# Patient Record
Sex: FEMALE | Race: BLACK OR AFRICAN AMERICAN
[De-identification: names, ages, dates, MRNs, and addresses within clinical notes are randomized per-mention and may not be internally consistent; named-entity substitution may affect disease eponyms.]

---

## 2017-04-06 ENCOUNTER — HOSPITAL ENCOUNTER (EMERGENCY)
Dept: HOSPITAL 27 - EMS | Age: 57
Discharge: HOME | End: 2017-04-06
Payer: COMMERCIAL

## 2017-04-06 VITALS — HEIGHT: 65 IN | BODY MASS INDEX: 30.05 KG/M2 | WEIGHT: 180.38 LBS

## 2017-04-06 VITALS — SYSTOLIC BLOOD PRESSURE: 136 MMHG | DIASTOLIC BLOOD PRESSURE: 76 MMHG

## 2017-04-06 DIAGNOSIS — Z79.82: ICD-10-CM

## 2017-04-06 DIAGNOSIS — G56.01: Primary | ICD-10-CM

## 2017-04-06 DIAGNOSIS — F17.210: ICD-10-CM

## 2017-04-06 DIAGNOSIS — K21.9: ICD-10-CM

## 2017-04-06 DIAGNOSIS — E11.9: ICD-10-CM

## 2017-04-06 DIAGNOSIS — I10: ICD-10-CM

## 2017-04-06 LAB — GLUCOSE BLDC GLUCOMTR-MCNC: 112 MG/DL (ref 70–110)

## 2017-04-06 PROCEDURE — 99283 EMERGENCY DEPT VISIT LOW MDM: CPT

## 2017-04-06 PROCEDURE — 82962 GLUCOSE BLOOD TEST: CPT

## 2017-04-06 PROCEDURE — 96372 THER/PROPH/DIAG INJ SC/IM: CPT

## 2017-05-14 ENCOUNTER — HOSPITAL ENCOUNTER (EMERGENCY)
Dept: HOSPITAL 27 - EMS | Age: 57
Discharge: HOME | End: 2017-05-14
Payer: COMMERCIAL

## 2017-05-14 VITALS — WEIGHT: 170.35 LBS | HEIGHT: 66 IN | BODY MASS INDEX: 27.38 KG/M2

## 2017-05-14 VITALS — SYSTOLIC BLOOD PRESSURE: 183 MMHG | DIASTOLIC BLOOD PRESSURE: 103 MMHG

## 2017-05-14 DIAGNOSIS — K21.9: ICD-10-CM

## 2017-05-14 DIAGNOSIS — E11.9: ICD-10-CM
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
## 2017-05-14 DIAGNOSIS — I10: ICD-10-CM
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
## 2017-05-14 DIAGNOSIS — S05.01XA: Primary | ICD-10-CM
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
## 2017-05-14 DIAGNOSIS — F17.210: ICD-10-CM

## 2017-05-14 PROCEDURE — 99173 VISUAL ACUITY SCREEN: CPT

## 2017-05-14 PROCEDURE — 99283 EMERGENCY DEPT VISIT LOW MDM: CPT

## 2017-05-14 PROCEDURE — 99406 BEHAV CHNG SMOKING 3-10 MIN: CPT

## 2018-03-24 ENCOUNTER — HOSPITAL ENCOUNTER (EMERGENCY)
Dept: HOSPITAL 27 - EMS | Age: 58
Discharge: HOME | End: 2018-03-24
Payer: COMMERCIAL

## 2018-03-24 VITALS — SYSTOLIC BLOOD PRESSURE: 155 MMHG | DIASTOLIC BLOOD PRESSURE: 83 MMHG

## 2018-03-24 VITALS — HEIGHT: 65 IN | WEIGHT: 184.39 LBS | BODY MASS INDEX: 30.72 KG/M2

## 2018-03-24 DIAGNOSIS — H10.212: ICD-10-CM
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
## 2018-03-24 DIAGNOSIS — I10: ICD-10-CM
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
## 2018-03-24 DIAGNOSIS — F17.210: ICD-10-CM

## 2018-03-24 DIAGNOSIS — Z79.82: ICD-10-CM

## 2018-03-24 DIAGNOSIS — S05.02XA: Primary | ICD-10-CM

## 2018-03-24 DIAGNOSIS — L25.9: ICD-10-CM

## 2018-03-24 DIAGNOSIS — K21.9: ICD-10-CM

## 2018-03-24 DIAGNOSIS — E11.9: ICD-10-CM

## 2018-03-24 LAB — GLUCOSE BLDC GLUCOMTR-MCNC: 189 MG/DL (ref 70–110)

## 2018-03-24 PROCEDURE — 99406 BEHAV CHNG SMOKING 3-10 MIN: CPT

## 2018-03-24 PROCEDURE — 82962 GLUCOSE BLOOD TEST: CPT

## 2018-03-24 PROCEDURE — 99284 EMERGENCY DEPT VISIT MOD MDM: CPT

## 2018-08-20 ENCOUNTER — HOSPITAL ENCOUNTER (EMERGENCY)
Dept: HOSPITAL 27 - EMS | Age: 58
LOS: 1 days | Discharge: HOME | End: 2018-08-21
Payer: COMMERCIAL

## 2018-08-20 VITALS — HEIGHT: 65 IN | WEIGHT: 186.4 LBS | BODY MASS INDEX: 31.06 KG/M2

## 2018-08-20 DIAGNOSIS — G89.29: ICD-10-CM

## 2018-08-20 DIAGNOSIS — S05.01XA: Primary | ICD-10-CM
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
## 2018-08-20 DIAGNOSIS — K21.9: ICD-10-CM

## 2018-08-20 DIAGNOSIS — E11.9: ICD-10-CM

## 2018-08-20 DIAGNOSIS — I10: ICD-10-CM
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
## 2018-08-20 DIAGNOSIS — Z79.84: ICD-10-CM
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
## 2018-08-20 DIAGNOSIS — F17.210: ICD-10-CM

## 2018-08-20 DIAGNOSIS — Z79.82: ICD-10-CM
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
## 2018-08-20 LAB — GLUCOSE BLDC GLUCOMTR-MCNC: 121 MG/DL (ref 70–110)

## 2018-08-20 PROCEDURE — 99283 EMERGENCY DEPT VISIT LOW MDM: CPT

## 2018-08-20 PROCEDURE — 99406 BEHAV CHNG SMOKING 3-10 MIN: CPT

## 2018-08-20 PROCEDURE — 82962 GLUCOSE BLOOD TEST: CPT

## 2018-08-21 VITALS — DIASTOLIC BLOOD PRESSURE: 74 MMHG | SYSTOLIC BLOOD PRESSURE: 140 MMHG

## 2020-08-21 ENCOUNTER — OFFICE VISIT CONVERTED (OUTPATIENT)
Dept: PODIATRY | Facility: CLINIC | Age: 60
End: 2020-08-21
Attending: PODIATRIST

## 2021-03-09 ENCOUNTER — HOSPITAL ENCOUNTER (OUTPATIENT)
Dept: MAMMOGRAPHY | Facility: HOSPITAL | Age: 61
Discharge: HOME | End: 2021-03-09
Attending: NURSE PRACTITIONER

## 2021-05-10 NOTE — H&P
History and Physical      Patient Name: Ronda Bell   Patient ID: [id number]   Sex: Female   YOB: 1960    Primary Care Provider: RAJ GRULLON   Referring Provider: RAJ GRULLON    Visit Date: August 21, 2020    Provider: Colby Lam DPM   Location: [facility]   Location Address: [address] [place] [address]  [id number]   Location Phone: [phone]          Chief Complaint  · Diabetic Foot Evaluation      History Of Present Illness  Ronda Bell presents to the office today as a new patient for a diabetic foot evaluation. On referral from RAJ GRULLON   Patient reports that she is a diabetic currently controlling diabetes with oral medication      New, Established, New Problem: new   Location: bilateral feet  Duration:  Onset: gradual  Nature: NIDDM  Stable, worsening, improving: stable  Aggravating factors:  Previous Treatment: oral medication    Patient denies any fevers, chills, nausea, vomiting, shortness of breathe, nor any other constitutional signs nor symptoms.    Pt states their most recent blood glucose reading was 120.       Past Medical History  Arthritis; Bunion; Corns and callus; Heel pain; Hyperlipidemia; Hypertension; Ingrown toenail; Numbness in feet; Stroke; Type 2 diabetes mellitus         Past Surgical History  C section         Medication List  aspirin 81 mg oral tablet,delayed release (DR/EC); carvedilol 6.25 mg oral tablet; metformin 500 mg oral tablet extended release 24 hr; multivitamin oral tablet; omeprazole 20 mg oral capsule,delayed release(DR/EC)         Allergy List  baclofen; Robaxin       Allergies Reconciled  Family Medical History  Family history of stroke; Family history of heart disease; Family history of diabetes mellitus (DM)         Social History  Alcohol (Never); Tobacco (Never)         Review of Systems  · Constitutional  o Denies  o : fatigue, night sweats  · Eyes  o Denies  o : double vision,  blurred vision  · HENT  o Denies  o : vertigo, recent head injury  · Cardiovascular  o Denies  o : chest pain, irregular heart beats  · Respiratory  o Denies  o : shortness of breath, productive cough  · Gastrointestinal  o Denies  o : nausea, vomiting  · Genitourinary  o Denies  o : dysuria, urinary retention  · Integument  o Denies  o : hair growth change, new skin lesions  · Neurologic  o Denies  o : altered mental status, seizures  · Musculoskeletal  o * See HPI  · Endocrine  o Denies  o : cold intolerance, heat intolerance  · Heme-Lymph  o Denies  o : petechiae, lymph node enlargement or tenderness  · Allergic-Immunologic  o Denies  o : frequent illnesses      Vitals  Date Time BP Position Site L\R Cuff Size HR RR TEMP (F) WT  HT  BMI kg/m2 BSA m2 O2 Sat HC       08/21/2020 10:37 /76 Sitting    65 - R  96.9 187lbs 7oz    100 %          Physical Examination  · Constitutional  o Appearance  o : awake, alert, well-developed, and well nourished   · Cardiovascular  o Peripheral Vascular System  o :   § Extremities  § : no edema noted  · Musculoskeletal  o Extremeties/Joint  o : Lower extremity muscle strength and range of motion is equal and symmetrical bilaterally. The knees are noted to be in normal alignment. Bilateral Medial deviation of the first metatarsal with associated lateral deviation of the hallux at the metatarsal phalangeal joints.  · Left DM Foot Exam  o Sensation  o : Sharp/dull sensation is within normal limits. [place]usam 5.07 monofilament intact to all assessed areas.   o Visual Inspection  o : Skin is noted to have normal texture and turgor, with no excrescences noted. The toenails are noted to be without disease  o Vascular  o : palpable dorsalis pedis and posterir tibialis pulses present, normal capillary refill  · Right DM Foot Exam  o Sensation  o : Sharp/dull sensation is within normal limits. [place]am 5.07 monofilament intact to all assessed areas.   o Visual  Inspection  o : Skin is noted to have normal texture and turgor, with no excrescences noted. The toenails are noted to be without disease  o Vascular  o : palpable dorsalis pedis and posterir tibialis pulses present, normal capillary refill          Assessment  · Diabetes     250.00/E11.9  · Foot pain, left     729.5/M79.672  · Foot pain, right     729.5/M79.671  · Hallux abducto valgus, bilateral       Hallux valgus (acquired), right foot     735.0/M20.11  Hallux valgus (acquired), left foot     735.0/M20.12      Plan  · Orders  o Diabetic Foot (Motor and Sensory) Exam Completed [facility] (, , 2028F) - - 08/21/2020  · Medications  o Medications have been Reconciled  o Transition of Care or Provider Policy  · Instructions  o Patient is to return in one year for their Podiatric Diabetic Evaluation. Diabetic foot exam performed and documented this date, compliant with CQM required standards. Detail of findings as noted in physical exam.Lower extremity Neuro exam for diabetic patient performed and documented this date, compliant with PQRS required standards. Detail of findings as noted in physical exam.Advised patient importance of good routine lower extremity hygiene. Advised patient importance of evaluating for intact skin and pain free nail borders. Advised patient to use mirror to evaluate plantar/ soles of feet for better visualization. Advised patient monitor and phone office to be seen if any cracking to skin, open lesions, painful nail borders or if nails become elongated prior to next visit. Advised patient importance of daily cleansing of lower extremities, followed by good skin cream to maintain normal hydration of skin. Also advised patient importance of close daily monitoring of blood sugar. Advised to regulate diet and medications to maintain control of blood sugar in optimal range. Contact primary care provider if difficulties maintaining blood sugar levels.Advised Patient of presence of Diabetes  Mellitus condition. Advised Patient risk of progression and worsening or improvement, then return of condition. Will monitor condition for any change in future. Treat with most appropriate treatment pending status of condition.Counseled and advised patient extensively on nature and ramifications of diabetes. Standard instructions given to patient for good diabetic foot care and maintenance. Advised importance of careful monitoring to avoid break down and complications secondary to diabetes. Advised patient importance of strict maintenance of blood sugar control. Advised patient of possible ominous results from neglect of condition,i.e.: amputation/ loss of digits, feet and legs, or even death.Patient states understands counseling, will monitor closely, continue good hygiene and routine diabetic foot care. Patient will contact office is questions or problems.   o Upon discussion of surgical correction, post-operative requirements along with risk and benefits of the surgery, the patient states they do not want to pursue surgery at this time. Procedure: Right Reverdin-Green-Bethany Bunionectomy.  o Electronically Identified Patient Education Materials Provided Electronically  · Disposition  o Call or Return if symptoms worsen or persist.            Electronically Signed by: Colby Lam DPM -Author on August 21, 2020 11:07:44 AM

## 2021-05-14 VITALS
DIASTOLIC BLOOD PRESSURE: 76 MMHG | HEART RATE: 65 BPM | OXYGEN SATURATION: 100 % | WEIGHT: 187.44 LBS | SYSTOLIC BLOOD PRESSURE: 140 MMHG | TEMPERATURE: 96.9 F

## 2021-08-23 ENCOUNTER — OFFICE VISIT (OUTPATIENT)
Dept: PODIATRY | Facility: CLINIC | Age: 61
End: 2021-08-23

## 2021-08-23 VITALS
HEART RATE: 76 BPM | SYSTOLIC BLOOD PRESSURE: 137 MMHG | WEIGHT: 174 LBS | DIASTOLIC BLOOD PRESSURE: 91 MMHG | OXYGEN SATURATION: 100 % | TEMPERATURE: 96.9 F

## 2021-08-23 DIAGNOSIS — M20.12 VALGUS DEFORMITY OF BOTH GREAT TOES: ICD-10-CM

## 2021-08-23 DIAGNOSIS — E11.8 DIABETIC FOOT (HCC): ICD-10-CM

## 2021-08-23 DIAGNOSIS — E11.9 NON-INSULIN DEPENDENT TYPE 2 DIABETES MELLITUS (HCC): Primary | ICD-10-CM

## 2021-08-23 DIAGNOSIS — M20.11 VALGUS DEFORMITY OF BOTH GREAT TOES: ICD-10-CM

## 2021-08-23 PROCEDURE — 99213 OFFICE O/P EST LOW 20 MIN: CPT | Performed by: PODIATRIST

## 2021-08-23 RX ORDER — ASPIRIN 81 MG/1
81 TABLET ORAL DAILY
COMMUNITY

## 2021-08-23 RX ORDER — OMEPRAZOLE 20 MG/1
CAPSULE, DELAYED RELEASE ORAL
COMMUNITY
End: 2022-04-25 | Stop reason: ALTCHOICE

## 2021-08-23 RX ORDER — METFORMIN HYDROCHLORIDE 500 MG/1
TABLET, EXTENDED RELEASE ORAL
COMMUNITY
End: 2022-04-25 | Stop reason: ALTCHOICE

## 2021-08-23 RX ORDER — TOLNAFTATE 1 %
AEROSOL, SPRAY (GRAM) TOPICAL
COMMUNITY
Start: 2021-07-07 | End: 2022-04-25

## 2021-08-23 RX ORDER — FLUCONAZOLE 150 MG/1
TABLET ORAL
COMMUNITY
Start: 2021-07-07 | End: 2022-05-02

## 2021-08-23 NOTE — PROGRESS NOTES
[facility] - PODIATRY    Today's Date: 21    Patient Name: Ronda Bell  MRN: [id number]  [facility]: [id number]  PCP: Provider, No Known  Referring Provider: No ref. provider found    SUBJECTIVE     Chief Complaint   Patient presents with   • Left Foot - Diabetes, Annual Exam   • Right Foot - Diabetes, Annual Exam     HPI: Ronda Bell, a 61 y.o.female, comes presents to clinic for a diabetic foot evaluation.    New, Established, New Problem: Established    Location:      Duration:     Onset: Insidious    Nature: NIDDM    Stable, worsening, improving: Stable    Patient controlling diabetes via: Oral medications    Patient denies any fevers, chills, nausea, vomiting, shortness of breathe, nor any other constitutional signs nor symptoms.    No other pedal complaints at this time.    Past Medical History:   Diagnosis Date   • Arthritis    • Bunion    • Corns and callus    • Foot pain, right 10/27/2020   • Heel pain    • HTN (hypertension)    • Hyperlipidemia    • Ingrown toenail    • Numbness in feet    • Stroke (CMS/HCC)    • Type 2 diabetes mellitus (CMS/[facility])      Past Surgical History:   Procedure Laterality Date   •  SECTION       Family History   Problem Relation Age of Onset   • Heart disease Mother    • Diabetes Mother    • Stroke Brother      Social History     Socioeconomic History   • Marital status:      Spouse name: Not on file   • Number of children: Not on file   • Years of education: Not on file   • Highest education level: Not on file   Tobacco Use   • Smoking status: Former Smoker     Types: Cigarettes   • Smokeless tobacco: Never Used   Vaping Use   • Vaping Use: Never used   Substance and Sexual Activity   • Alcohol use: Never   • Drug use: Not Currently     Types: Cocaine(coke)   • Sexual activity: Defer     Allergies   Allergen Reactions   • Baclofen Dizziness   • Methocarbamol Dizziness     Current Outpatient Medications   Medication Sig  Dispense Refill   • aspirin (aspirin) 81 MG EC tablet aspirin 81 mg oral tablet,delayed release (DR/EC) take 1 tablet (81 mg) by oral route once daily   Active     • fluconazole (DIFLUCAN) 150 MG tablet TAKE 1 TABLET BY MOUTH 1 TIME WEEKLY FOR 10 WEEKS     • Lotrimin AF Deodorant Powder 2 % aerosol powder APPLY TO AFFECTED AREA THREE TIMES DAILY     • metFORMIN ER (GLUCOPHAGE-XR) 500 MG 24 hr tablet metformin 500 mg oral tablet extended release 24 hr take 1 tablet by oral route 2 times a day   Active     • metoprolol tartrate (LOPRESSOR) 25 MG tablet Take 25 mg by mouth Daily.     • Multiple Vitamins-Minerals (MULTIVITAMIN ADULT EXTRA C PO) multivitamin oral tablet take 1 tablet by oral route daily   Active     • omeprazole (priLOSEC) 20 MG capsule omeprazole 20 mg oral capsule,delayed release(DR/EC) take 1 capsule (20 mg) by oral route once daily before a meal   Active       No current facility-administered medications for this visit.     Review of Systems   Constitutional: Negative.    All other systems reviewed and are negative.      OBJECTIVE     Vitals:    08/23/21 0908   BP: 137/91   Pulse: 76   Temp: 96.9 °F (36.1 °C)   SpO2: 100%       There is no height or weight on file to calculate BMI.    Lab Results   Component Value Date    HGBA1C 6.3 (H) 10/17/2019       Lab Results   Component Value Date    CALCIUM 9.1 10/04/2020     10/04/2020    K 4.1 10/04/2020    CO2 28 10/04/2020     10/04/2020    BUN 8 10/04/2020    CREATININE 0.95 (H) 10/04/2020    BCR 8 10/04/2020    ANIONGAP 13 10/04/2020       Patient seen in no apparent distress.      PHYSICAL EXAM:     Foot/Ankle Exam:       General:   Diabetic Foot Exam Performed    Appearance: appears stated age and healthy    Orientation: AAOx3    Affect: appropriate    Gait: unimpaired    Shoe Gear:  Casual shoes    VASCULAR      Right Foot Vascularity   Normal vascular exam    Dorsalis pedis:  2+  Posterior tibial:  2+  Skin Temperature: warm    Edema  Grading:  None  CFT:  < 3 seconds  Pedal Hair Growth:  Present  Varicosities: none       Left Foot Vascularity   Normal vascular exam    Dorsalis pedis:  2+  Posterior tibial:  2+  Skin Temperature: warm    Edema Grading:  None  CFT:  < 3 seconds  Pedal Hair Growth:  Present  Varicosities: none        NEUROLOGIC     Right Foot Neurologic   Normal sensation    Light touch sensation:  Normal  Vibratory sensation:  Normal  Hot/Cold sensation: normal    Protective Sensation using [place]usam Monofilament:  10     Left Foot Neurologic   Normal sensation    Light touch sensation:  Normal  Vibratory sensation:  Normal  Hot/cold sensation: normal    Protective Sensation using [place]usam Monofilament:  10     MUSCULOSKELETAL      Right Foot Musculoskeletal   Hallux valgus: Yes       Left Foot Musculoskeletal   Hallux valgus: Yes       MUSCLE STRENGTH     Right Foot Muscle Strength   Normal strength    Foot dorsiflexion:  5  Foot plantar flexion:  5  Foot inversion:  5  Foot eversion:  5     Left Foot Muscle Strength   Foot dorsiflexion:  5  Foot plantar flexion:  5  Foot inversion:  5  Foot eversion:  5     RANGE OF MOTION      Right Foot Range of Motion   Foot and ankle ROM within normal limits       Left Foot Range of Motion   Foot and ankle ROM within normal limits       DERMATOLOGIC     Right Foot Dermatologic   Skin: skin intact    Nails: normal       Left Foot Dermatologic   Skin: skin intact    Nails: normal        Diabetic Foot Exam Performed      ASSESSMENT/PLAN     Diagnoses and all orders for this visit:    1. Non-insulin dependent type 2 diabetes mellitus (CMS/HCC) (Primary)    2. Diabetic foot (CMS/HCC)    3. Valgus deformity of both great toes        Comprehensive lower extremity examination and evaluation was performed.    Discussed findings and treatment plan including risks, benefits, and treatment options with patient in detail. Patient agreed with treatment plan.    Diabetic foot exam performed  and documented this date, compliant with CQM required standards. Detail of findings as noted in physical exam.  Lower extremity Neurologic exam for diabetic patient performed and documented this date, compliant with PQRS required standards. Detail of findings as noted in physical exam.  Advised patient importance of good routine lower extremity hygiene. Advised patient importance of evaluating for intact skin and pain free nail borders.  Advised patient to use mirror to evaluate plantar/ soles of feet for better visualization. Advised patient monitor and phone office to be seen if any cracking to skin, open lesions, painful nail borders or if nails become elongated prior to next visit. Advised patient importance of daily cleansing of lower extremities, followed by good skin cream to maintain normal hydration of skin. Also advised patient importance of close daily monitoring of blood sugar. Advised to regulate diet and medications to maintain control of blood sugar in optimal range. Contact primary care provider if difficulties maintaining blood sugar levels.  Advised Patient of presence of Diabetes Mellitus condition.  Advised Patient risk of progression and worsening or improvement, then return of condition.  Will monitor condition for any change in future. Treat with most appropriate treatment pending status of condition.  Counseled and advised patient extensively on nature and ramifications of diabetes. Standard instructions given to patient for good diabetic foot care and maintenance. Advised importance of careful monitoring to avoid break down and complications secondary to diabetes. Advised patient importance of strict maintenance of blood sugar control. Advised patient of possible ominous results from neglect of condition, i.e.: amputation/ loss of digits, feet and legs, or even death.  Patient states understands counseling, will monitor closely, continue good hygiene and routine diabetic foot care. Patient  will contact office is questions or problems.      An After Visit Summary was printed and given to the patient at discharge, including (if requested) any available informative/educational handouts regarding diagnosis, treatment, or medications. All questions were answered to patient/family satisfaction. Should symptoms fail to improve or worsen they agree to call or return to clinic or to go to the Emergency Department. Discussed the importance of following up with any needed screening tests/labs/specialist appointments and any requested follow-up recommended by me today. Importance of maintaining follow-up discussed and patient accepts that missed appointments can delay diagnosis and potentially lead to worsening of conditions.    Return in about 1 year (around 8/23/2022) for Podiatry Diabetic Foot Exam., or sooner if acute issues arise.    This document has been electronically signed by Colby Lam DPM on August 23, 2021 09:33 EDT

## 2022-04-25 ENCOUNTER — OFFICE VISIT (OUTPATIENT)
Dept: PODIATRY | Facility: CLINIC | Age: 62
End: 2022-04-25

## 2022-04-25 VITALS
HEIGHT: 65 IN | HEART RATE: 73 BPM | TEMPERATURE: 96.9 F | OXYGEN SATURATION: 100 % | DIASTOLIC BLOOD PRESSURE: 56 MMHG | BODY MASS INDEX: 29.16 KG/M2 | WEIGHT: 175 LBS | SYSTOLIC BLOOD PRESSURE: 142 MMHG

## 2022-04-25 DIAGNOSIS — M79.671 FOOT PAIN, RIGHT: Primary | ICD-10-CM

## 2022-04-25 DIAGNOSIS — M20.11 HALLUX VALGUS OF RIGHT FOOT: ICD-10-CM

## 2022-04-25 DIAGNOSIS — E11.8 DIABETIC FOOT: ICD-10-CM

## 2022-04-25 DIAGNOSIS — E11.9 NON-INSULIN DEPENDENT TYPE 2 DIABETES MELLITUS: ICD-10-CM

## 2022-04-25 PROCEDURE — 99214 OFFICE O/P EST MOD 30 MIN: CPT | Performed by: PODIATRIST

## 2022-04-25 RX ORDER — AMLODIPINE BESYLATE 10 MG/1
10 TABLET ORAL DAILY
COMMUNITY
Start: 2022-04-15

## 2022-04-25 RX ORDER — ATORVASTATIN CALCIUM 20 MG/1
20 TABLET, FILM COATED ORAL NIGHTLY
COMMUNITY
Start: 2022-04-15

## 2022-04-25 RX ORDER — PANTOPRAZOLE SODIUM 20 MG/1
20 TABLET, DELAYED RELEASE ORAL DAILY
COMMUNITY
Start: 2022-02-27

## 2022-04-25 NOTE — PROGRESS NOTES
[facility] - PODIATRY    Today's Date: 22    Patient Name: Ronda Bell  MRN: [id number]  CSN: [id number]  PCP: Johnna Mcnair APRN  Referring Provider: No ref. provider found    SUBJECTIVE     Chief Complaint   Patient presents with   • Right Foot - Pre-op Exam     HPI: Ronda Bell, a 61 y.o.female, comes presents to clinic for preoperative history and physical for right bunionectomy    New, Established, New Problem: Established    Location: Right first ray    Duration:     Onset: Insidious    Nature: Sore, achy    Stable, worsening, improving: Worsening    Patient controlling diabetes via: Oral medications    Patient denies any fevers, chills, nausea, vomiting, shortness of breathe, nor any other constitutional signs nor symptoms.    Procedure treated with change in shoe gear and activities.    Patient states their most recent HgB A1C was 6.2.    Past Medical History:   Diagnosis Date   • Arthritis    • Bunion    • Corns and callus    • Foot pain, right 10/27/2020   • Heel pain    • HTN (hypertension)    • Hyperlipidemia    • Ingrown toenail    • Numbness in feet    • Stroke (HCC)    • Type 2 diabetes mellitus (HCC)      Past Surgical History:   Procedure Laterality Date   •  SECTION       Family History   Problem Relation Age of Onset   • Heart disease Mother    • Diabetes Mother    • Stroke Brother      Social History     Socioeconomic History   • Marital status:    Tobacco Use   • Smoking status: Former Smoker     Types: Cigarettes   • Smokeless tobacco: Never Used   Vaping Use   • Vaping Use: Never used   Substance and Sexual Activity   • Alcohol use: Yes     Comment: occas   • Drug use: Not Currently     Types: Cocaine(coke)   • Sexual activity: Defer     Allergies   Allergen Reactions   • Baclofen Dizziness   • Methocarbamol Dizziness     Current Outpatient Medications   Medication Sig Dispense Refill   • amLODIPine (NORVASC) 10 MG tablet Take 10 mg by  mouth Daily.     • aspirin 81 MG EC tablet aspirin 81 mg oral tablet,delayed release (DR/EC) take 1 tablet (81 mg) by oral route once daily   Active     • atorvastatin (LIPITOR) 20 MG tablet      • fluconazole (DIFLUCAN) 150 MG tablet TAKE 1 TABLET BY MOUTH 1 TIME WEEKLY FOR 10 WEEKS     • metFORMIN (GLUCOPHAGE) 500 MG tablet      • metoprolol tartrate (LOPRESSOR) 25 MG tablet Take 25 mg by mouth Daily.     • Multiple Vitamins-Minerals (MULTIVITAMIN ADULT EXTRA C PO) multivitamin oral tablet take 1 tablet by oral route daily   Active     • pantoprazole (PROTONIX) 20 MG EC tablet        No current facility-administered medications for this visit.     Review of Systems   Constitutional: Negative.    Musculoskeletal:        Painful right first ray bunion   All other systems reviewed and are negative.      OBJECTIVE     Vitals:    04/25/22 1506   BP: 142/56   Pulse: 73   Temp: 96.9 °F (36.1 °C)   SpO2: 100%       Body mass index is 29.12 kg/m².    Lab Results   Component Value Date    HGBA1C 6.3 (H) 10/17/2019       Lab Results   Component Value Date    GLUCOSE 99 10/04/2020    CALCIUM 9.1 10/04/2020     10/04/2020    K 4.1 10/04/2020    CO2 28 10/04/2020     10/04/2020    BUN 8 10/04/2020    CREATININE 0.95 (H) 10/04/2020    BCR 8 10/04/2020    ANIONGAP 13 10/04/2020       Patient seen in no apparent distress.      PHYSICAL EXAM:     Foot/Ankle Exam:       General:   Appearance: appears stated age and healthy    Orientation: AAOx3    Affect: appropriate    Gait: unimpaired    Shoe Gear:  Casual shoes    VASCULAR      Right Foot Vascularity   Normal vascular exam    Dorsalis pedis:  2+  Posterior tibial:  2+  Skin Temperature: warm    Edema Grading:  None  CFT:  < 3 seconds  Pedal Hair Growth:  Present  Varicosities: none       Left Foot Vascularity   Normal vascular exam    Dorsalis pedis:  2+  Posterior tibial:  2+  Skin Temperature: warm    Edema Grading:  None  CFT:  < 3 seconds  Pedal Hair Growth:   Present  Varicosities: none        NEUROLOGIC     Right Foot Neurologic   Normal sensation    Light touch sensation:  Normal  Vibratory sensation:  Normal  Hot/Cold sensation: normal    Protective Sensation using [place]-Husam Monofilament:  10     Left Foot Neurologic   Normal sensation    Light touch sensation:  Normal  Vibratory sensation:  Normal  Hot/cold sensation: normal    Protective Sensation using [place]-Husam Monofilament:  10     MUSCULOSKELETAL      Right Foot Musculoskeletal   Hallux valgus: Yes       Left Foot Musculoskeletal   Hallux valgus: Yes       MUSCLE STRENGTH     Right Foot Muscle Strength   Normal strength    Foot dorsiflexion:  5  Foot plantar flexion:  5  Foot inversion:  5  Foot eversion:  5     Left Foot Muscle Strength   Foot dorsiflexion:  5  Foot plantar flexion:  5  Foot inversion:  5  Foot eversion:  5     RANGE OF MOTION      Right Foot Range of Motion   Foot and ankle ROM within normal limits       Left Foot Range of Motion   Foot and ankle ROM within normal limits       DERMATOLOGIC     Right Foot Dermatologic   Skin: skin intact    Nails: normal       Left Foot Dermatologic   Skin: skin intact    Nails: normal        Diabetic Foot Exam Performed    XR Foot 3+ View Right    Result Date: 4/25/2022  Narrative: IN-OFFICE IMAGING:  3 view, AP, MO, Lateral, right foot Indication: Bunion pain Findings: Medial deviation of the first metatarsal with associated lateral deviation of the hallux at the metatarsal phalangeal joint.  Anterior cyma line break seen.  No periosteal reactions nor osteolytic changes seen.  No occult fractures seen. Comparison: No comparison views available.         ASSESSMENT/PLAN     Diagnoses and all orders for this visit:    1. Foot pain, right (Primary)    2. Hallux valgus of right foot  -     Case Request; Standing  -     Case Request    3. Diabetic foot (HCC)    4. Non-insulin dependent type 2 diabetes mellitus (HCC)        Comprehensive lower  extremity examination and evaluation was performed.    Discussed findings and treatment plan including risks, benefits, and treatment options with patient in detail. Patient agreed with treatment plan.    Planned surgery: Right Reverdin-Green-Isola Bunionectomy with Akin bunionectomy.  The risks and benefits of the surgery were discussed with the patient.  This discussion included possible complications of requiring further surgery, possible delayed wound healing, further surgery requiring amputation of the foot or leg, and also included the complication of death.  All the patient's questions were answered to their satisfaction.  Patient states they would like to proceed with the procedure.  Upon discussion of non-surgical conservative option, surgical correction, post-operative requirements along with risk and benefits of the surgery along with expected outcomes, the patient states they would like to proceed with the scheduling surgery.    The surgery is planned for 6 May 2022.    An After Visit Summary was printed and given to the patient at discharge, including (if requested) any available informative/educational handouts regarding diagnosis, treatment, or medications. All questions were answered to patient/family satisfaction. Should symptoms fail to improve or worsen they agree to call or return to clinic or to go to the Emergency Department. Discussed the importance of following up with any needed screening tests/labs/specialist appointments and any requested follow-up recommended by me today. Importance of maintaining follow-up discussed and patient accepts that missed appointments can delay diagnosis and potentially lead to worsening of conditions.    Return in about 15 days (around 5/10/2022) for Post Operative., or sooner if acute issues arise.    This document has been electronically signed by Colby Lam DPM on April 25, 2022 16:29 EDT

## 2022-05-02 NOTE — NURSING NOTE
SPOKE WITH TU AT DR MITCHELL OFFICE TO VERIFY IF AND WHEN WANTED ASA STOPPED. PER DR MITCHELL TO STOP TODAY. PT NOTIFIED AND UPDATED SHE REPORTED HAD NOT TAKEN A DOSE TODAY YET AND THAT HER LAST DOSE WAS 5/1/22.

## 2022-05-06 ENCOUNTER — ANESTHESIA (OUTPATIENT)
Dept: PERIOP | Facility: HOSPITAL | Age: 62
End: 2022-05-06

## 2022-05-06 ENCOUNTER — ANESTHESIA EVENT (OUTPATIENT)
Dept: PERIOP | Facility: HOSPITAL | Age: 62
End: 2022-05-06

## 2022-05-06 ENCOUNTER — HOSPITAL ENCOUNTER (OUTPATIENT)
Facility: HOSPITAL | Age: 62
Setting detail: HOSPITAL OUTPATIENT SURGERY
Discharge: HOME | End: 2022-05-06
Attending: PODIATRIST | Admitting: PODIATRIST

## 2022-05-06 VITALS
SYSTOLIC BLOOD PRESSURE: 127 MMHG | HEIGHT: 65 IN | RESPIRATION RATE: 16 BRPM | HEART RATE: 76 BPM | TEMPERATURE: 97 F | OXYGEN SATURATION: 99 % | DIASTOLIC BLOOD PRESSURE: 60 MMHG | WEIGHT: 184.53 LBS | BODY MASS INDEX: 30.74 KG/M2

## 2022-05-06 DIAGNOSIS — M20.11 HALLUX VALGUS OF RIGHT FOOT: ICD-10-CM

## 2022-05-06 LAB
GLUCOSE BLDC GLUCOMTR-MCNC: 112 MG/DL (ref 70–99)
GLUCOSE BLDC GLUCOMTR-MCNC: 94 MG/DL (ref 70–99)

## 2022-05-06 PROCEDURE — 0 LIDOCAINE 1 % SOLUTION: Performed by: PODIATRIST

## 2022-05-06 PROCEDURE — 28299 COR HLX VLGS DOUBLE OSTEOT: CPT | Performed by: PODIATRIST

## 2022-05-06 PROCEDURE — 28299 COR HLX VLGS DOUBLE OSTEOT: CPT | Performed by: SPECIALIST/TECHNOLOGIST, OTHER

## 2022-05-06 PROCEDURE — C1713 ANCHOR/SCREW BN/BN,TIS/BN: HCPCS | Performed by: PODIATRIST

## 2022-05-06 PROCEDURE — 25010000002 KETOROLAC TROMETHAMINE PER 15 MG: Performed by: NURSE ANESTHETIST, CERTIFIED REGISTERED

## 2022-05-06 PROCEDURE — 25010000002 CEFAZOLIN IN DEXTROSE 2-4 GM/100ML-% SOLUTION: Performed by: PODIATRIST

## 2022-05-06 PROCEDURE — 63710000001 PROMETHAZINE PER 12.5 MG: Performed by: NURSE ANESTHETIST, CERTIFIED REGISTERED

## 2022-05-06 PROCEDURE — 25010000002 ONDANSETRON PER 1 MG: Performed by: NURSE ANESTHETIST, CERTIFIED REGISTERED

## 2022-05-06 PROCEDURE — 25010000002 MIDAZOLAM PER 1 MG: Performed by: STUDENT IN AN ORGANIZED HEALTH CARE EDUCATION/TRAINING PROGRAM

## 2022-05-06 PROCEDURE — 25010000002 FENTANYL CITRATE (PF) 50 MCG/ML SOLUTION: Performed by: NURSE ANESTHETIST, CERTIFIED REGISTERED

## 2022-05-06 PROCEDURE — 25010000002 PROPOFOL 10 MG/ML EMULSION: Performed by: NURSE ANESTHETIST, CERTIFIED REGISTERED

## 2022-05-06 PROCEDURE — 82962 GLUCOSE BLOOD TEST: CPT

## 2022-05-06 DEVICE — STRAIGHT STAPLE ASSEMBLY, 8 X 8MM
Type: IMPLANTABLE DEVICE | Site: FOOT | Status: FUNCTIONAL
Brand: JAWS NITINOL STAPLE SYSTEM

## 2022-05-06 DEVICE — MINI MONSTER HEADLESS, SHORT THREAD, 2.5 X 22MM
Type: IMPLANTABLE DEVICE | Status: FUNCTIONAL
Brand: MONSTER SCREW SYSTEM

## 2022-05-06 DEVICE — WAX,BONE,NATURAL
Type: IMPLANTABLE DEVICE | Site: FOOT | Status: FUNCTIONAL
Brand: MEDLINE INDUSTRIES

## 2022-05-06 RX ORDER — FENTANYL CITRATE 50 UG/ML
INJECTION, SOLUTION INTRAMUSCULAR; INTRAVENOUS AS NEEDED
Status: DISCONTINUED | OUTPATIENT
Start: 2022-05-06 | End: 2022-05-06 | Stop reason: SURG

## 2022-05-06 RX ORDER — LIDOCAINE HYDROCHLORIDE 20 MG/ML
INJECTION, SOLUTION EPIDURAL; INFILTRATION; INTRACAUDAL; PERINEURAL AS NEEDED
Status: DISCONTINUED | OUTPATIENT
Start: 2022-05-06 | End: 2022-05-06 | Stop reason: SURG

## 2022-05-06 RX ORDER — KETOROLAC TROMETHAMINE 30 MG/ML
INJECTION, SOLUTION INTRAMUSCULAR; INTRAVENOUS AS NEEDED
Status: DISCONTINUED | OUTPATIENT
Start: 2022-05-06 | End: 2022-05-06 | Stop reason: SURG

## 2022-05-06 RX ORDER — ONDANSETRON 2 MG/ML
4 INJECTION INTRAMUSCULAR; INTRAVENOUS ONCE AS NEEDED
Status: DISCONTINUED | OUTPATIENT
Start: 2022-05-06 | End: 2022-05-06 | Stop reason: HOSPADM

## 2022-05-06 RX ORDER — MEPERIDINE HYDROCHLORIDE 25 MG/ML
12.5 INJECTION INTRAMUSCULAR; INTRAVENOUS; SUBCUTANEOUS
Status: DISCONTINUED | OUTPATIENT
Start: 2022-05-06 | End: 2022-05-06 | Stop reason: HOSPADM

## 2022-05-06 RX ORDER — ONDANSETRON 2 MG/ML
INJECTION INTRAMUSCULAR; INTRAVENOUS AS NEEDED
Status: DISCONTINUED | OUTPATIENT
Start: 2022-05-06 | End: 2022-05-06 | Stop reason: SURG

## 2022-05-06 RX ORDER — PROMETHAZINE HYDROCHLORIDE 12.5 MG/1
25 TABLET ORAL ONCE AS NEEDED
Status: COMPLETED | OUTPATIENT
Start: 2022-05-06 | End: 2022-05-06

## 2022-05-06 RX ORDER — CEFAZOLIN SODIUM 2 G/100ML
2 INJECTION, SOLUTION INTRAVENOUS ONCE
Status: COMPLETED | OUTPATIENT
Start: 2022-05-06 | End: 2022-05-06

## 2022-05-06 RX ORDER — LIDOCAINE HYDROCHLORIDE 10 MG/ML
INJECTION, SOLUTION INFILTRATION; PERINEURAL AS NEEDED
Status: DISCONTINUED | OUTPATIENT
Start: 2022-05-06 | End: 2022-05-06 | Stop reason: HOSPADM

## 2022-05-06 RX ORDER — SODIUM CHLORIDE, SODIUM LACTATE, POTASSIUM CHLORIDE, CALCIUM CHLORIDE 600; 310; 30; 20 MG/100ML; MG/100ML; MG/100ML; MG/100ML
9 INJECTION, SOLUTION INTRAVENOUS CONTINUOUS PRN
Status: DISCONTINUED | OUTPATIENT
Start: 2022-05-06 | End: 2022-05-06 | Stop reason: HOSPADM

## 2022-05-06 RX ORDER — MIDAZOLAM HYDROCHLORIDE 1 MG/ML
2 INJECTION INTRAMUSCULAR; INTRAVENOUS ONCE
Status: COMPLETED | OUTPATIENT
Start: 2022-05-06 | End: 2022-05-06

## 2022-05-06 RX ORDER — SUCCINYLCHOLINE/SOD CL,ISO/PF 100 MG/5ML
SYRINGE (ML) INTRAVENOUS AS NEEDED
Status: DISCONTINUED | OUTPATIENT
Start: 2022-05-06 | End: 2022-05-06 | Stop reason: SURG

## 2022-05-06 RX ORDER — ACETAMINOPHEN 500 MG
1000 TABLET ORAL ONCE
Status: COMPLETED | OUTPATIENT
Start: 2022-05-06 | End: 2022-05-06

## 2022-05-06 RX ORDER — GLYCOPYRROLATE 0.2 MG/ML
0.2 INJECTION INTRAMUSCULAR; INTRAVENOUS
Status: COMPLETED | OUTPATIENT
Start: 2022-05-06 | End: 2022-05-06

## 2022-05-06 RX ORDER — EPHEDRINE SULFATE 50 MG/ML
INJECTION, SOLUTION INTRAVENOUS AS NEEDED
Status: DISCONTINUED | OUTPATIENT
Start: 2022-05-06 | End: 2022-05-06 | Stop reason: SURG

## 2022-05-06 RX ORDER — PROPOFOL 10 MG/ML
VIAL (ML) INTRAVENOUS AS NEEDED
Status: DISCONTINUED | OUTPATIENT
Start: 2022-05-06 | End: 2022-05-06 | Stop reason: SURG

## 2022-05-06 RX ORDER — HYDROCODONE BITARTRATE AND ACETAMINOPHEN 5; 325 MG/1; MG/1
1-2 TABLET ORAL EVERY 4 HOURS PRN
Qty: 30 TABLET | Refills: 0 | Status: SHIPPED | OUTPATIENT
Start: 2022-05-06 | End: 2022-05-24

## 2022-05-06 RX ORDER — OXYCODONE HYDROCHLORIDE 5 MG/1
5 TABLET ORAL
Status: DISCONTINUED | OUTPATIENT
Start: 2022-05-06 | End: 2022-05-06 | Stop reason: HOSPADM

## 2022-05-06 RX ORDER — PROMETHAZINE HYDROCHLORIDE 25 MG/1
25 SUPPOSITORY RECTAL ONCE AS NEEDED
Status: COMPLETED | OUTPATIENT
Start: 2022-05-06 | End: 2022-05-06

## 2022-05-06 RX ORDER — BUPIVACAINE HYDROCHLORIDE 2.5 MG/ML
INJECTION, SOLUTION EPIDURAL; INFILTRATION; INTRACAUDAL AS NEEDED
Status: DISCONTINUED | OUTPATIENT
Start: 2022-05-06 | End: 2022-05-06 | Stop reason: HOSPADM

## 2022-05-06 RX ORDER — PROMETHAZINE HYDROCHLORIDE 12.5 MG/1
12.5 TABLET ORAL EVERY 8 HOURS PRN
Qty: 30 TABLET | Refills: 0 | Status: SHIPPED | OUTPATIENT
Start: 2022-05-06

## 2022-05-06 RX ADMIN — LIDOCAINE HYDROCHLORIDE 100 MG: 20 INJECTION, SOLUTION EPIDURAL; INFILTRATION; INTRACAUDAL; PERINEURAL at 12:58

## 2022-05-06 RX ADMIN — FENTANYL CITRATE 25 MCG: 50 INJECTION, SOLUTION INTRAMUSCULAR; INTRAVENOUS at 14:32

## 2022-05-06 RX ADMIN — FENTANYL CITRATE 25 MCG: 50 INJECTION, SOLUTION INTRAMUSCULAR; INTRAVENOUS at 13:59

## 2022-05-06 RX ADMIN — EPHEDRINE SULFATE 5 MG: 50 INJECTION INTRAVENOUS at 13:41

## 2022-05-06 RX ADMIN — CEFAZOLIN SODIUM 2 G: 2 INJECTION, SOLUTION INTRAVENOUS at 13:01

## 2022-05-06 RX ADMIN — EPHEDRINE SULFATE 5 MG: 50 INJECTION INTRAVENOUS at 13:14

## 2022-05-06 RX ADMIN — KETOROLAC TROMETHAMINE 30 MG: 30 INJECTION, SOLUTION INTRAMUSCULAR; INTRAVENOUS at 14:35

## 2022-05-06 RX ADMIN — SODIUM CHLORIDE, POTASSIUM CHLORIDE, SODIUM LACTATE AND CALCIUM CHLORIDE 9 ML/HR: 600; 310; 30; 20 INJECTION, SOLUTION INTRAVENOUS at 12:22

## 2022-05-06 RX ADMIN — PROPOFOL 180 MG: 10 INJECTION, EMULSION INTRAVENOUS at 12:58

## 2022-05-06 RX ADMIN — EPHEDRINE SULFATE 5 MG: 50 INJECTION INTRAVENOUS at 13:56

## 2022-05-06 RX ADMIN — GLYCOPYRROLATE 0.2 MG: 0.2 INJECTION INTRAMUSCULAR; INTRAVENOUS at 12:46

## 2022-05-06 RX ADMIN — Medication 100 MG: at 12:58

## 2022-05-06 RX ADMIN — FENTANYL CITRATE 50 MCG: 50 INJECTION, SOLUTION INTRAMUSCULAR; INTRAVENOUS at 12:55

## 2022-05-06 RX ADMIN — MIDAZOLAM HYDROCHLORIDE 2 MG: 1 INJECTION, SOLUTION INTRAMUSCULAR; INTRAVENOUS at 12:46

## 2022-05-06 RX ADMIN — ONDANSETRON 4 MG: 2 INJECTION INTRAMUSCULAR; INTRAVENOUS at 14:32

## 2022-05-06 RX ADMIN — PROMETHAZINE HYDROCHLORIDE 25 MG: 12.5 TABLET ORAL at 16:07

## 2022-05-06 RX ADMIN — ACETAMINOPHEN 1000 MG: 500 TABLET ORAL at 12:21

## 2022-05-06 NOTE — ANESTHESIA PREPROCEDURE EVALUATION
Anesthesia Evaluation     Patient summary reviewed and Nursing notes reviewed   no history of anesthetic complications:  NPO Solid Status: > 8 hours  NPO Liquid Status: > 2 hours           Airway   Mallampati: III  TM distance: >3 FB  Possible difficult intubation  Dental          Pulmonary - negative pulmonary ROS and normal exam   Cardiovascular - normal exam  Exercise tolerance: good (4-7 METS)    ECG reviewed    (+) hypertension, hyperlipidemia,     ROS comment: 2019 echo:  IMPRESSION:      1.  Normal ejection fraction of 55%.      2.  Mild left ventricular hypertrophy.      3.  Trace tricuspid regurgitation.        Neuro/Psych  (+) TIA, CVA (no residual), numbness,    GI/Hepatic/Renal/Endo    (+)  GERD,  diabetes mellitus,     Musculoskeletal     Abdominal  - normal exam   Substance History - negative use     OB/GYN negative ob/gyn ROS         Other   arthritis,      ROS/Med Hx Other:                     Anesthesia Plan    ASA 3     general and MAC   (Patient understands anesthesia not responsible for dental damage.)  intravenous induction     Anesthetic plan, all risks, benefits, and alternatives have been provided, discussed and informed consent has been obtained with: patient.    Plan discussed with CRNA.        CODE STATUS:

## 2022-05-06 NOTE — DISCHARGE INSTRUCTIONS
DISCHARGE INSTRUCTIONS  SURGICAL / AMBULATORY  PROCEDURES      For your surgery you had:  General anesthesia (you may have a sore throat for the first 24 hours)  You may experience dizziness, drowsiness, or light-headedness for several hours following surgery/procedure.  Do not stay alone today or tonight.  Limit your activity for 24 hours.  Resume your diet slowly.  Follow whatever special dietary instructions you may have been given by your doctor.  You should not drive or operate machinery, drink alcohol, or sign legally binding documents for 24 hours or while you are taking pain medication.    NOTIFY YOUR DOCTOR IF YOU EXPERIENCE ANY OF THE FOLLOWING:  Temperature greater than 101 degrees Fahrenheit  Shaking Chills  Redness or excessive drainage from incision  Nausea, vomiting and/or pain that is not controlled by prescribed medications  Increase in bleeding or bleeding that is excessive  Unable to urinate in 6 hours after surgery  If unable to reach your doctor, please go to the closest Emergency Room  Do NOT remove dressing.  You may shower or bathe tomorrow, do not get dressing wet.  Apply an ice pack 24-48 hours.  Medications per physician instructions as indicated on Discharge Medication Information Sheet.    SPECIAL INSTRUCTIONS:  See attached paper from [facility].      Last dose of pain medication was given at:  Tylenol (1000mg) last at 12:20pm, may take norco at any time. Do not exceed 4000mg of tylenol in a 24 hour period.  Toradol last at 2:30pm, may take ibuprofen next at 8:30pm if needed.

## 2022-05-06 NOTE — ANESTHESIA POSTPROCEDURE EVALUATION
Patient: Ronda Bell    Procedure Summary     Date: 05/06/22 Room / Location: [facility] OR 02 / [facility] MAIN OR    Anesthesia Start: 1253 Anesthesia Stop: 1447    Procedure: BUNIONECTOMY REVERDINE GREEN LAYERED WITH AKIN (Right Foot) Diagnosis:       Hallux valgus of right foot      (Hallux valgus of right foot [M20.11])    Surgeons: Colby Lam DPM Provider: Sammy Robles MD    Anesthesia Type: general, MAC ASA Status: 3          Anesthesia Type: general, MAC    Vitals  Vitals Value Taken Time   /64 05/06/22 1452   Temp 36.5 °C (97.7 °F) 05/06/22 1445   Pulse 67 05/06/22 1454   Resp 12 05/06/22 1445   SpO2 96 % 05/06/22 1454   Vitals shown include unvalidated device data.        Post Anesthesia Care and Evaluation    Patient location during evaluation: bedside  Patient participation: complete - patient participated  Level of consciousness: awake  Pain management: adequate  Airway patency: patent  Anesthetic complications: No anesthetic complications  PONV Status: none  Cardiovascular status: acceptable and stable  Respiratory status: acceptable and room air  Hydration status: acceptable    Comments: An Anesthesiologist personally participated in the most demanding procedures (including induction and emergence if applicable) in the anesthesia plan, monitored the course of anesthesia administration at frequent intervals and remained physically present and available for immediate diagnosis and treatment of emergencies.

## 2022-05-06 NOTE — OP NOTE
Pre-Operative Diagnosis:  Right Hallux abductovalgus deformity; ICD-10:  M20.11 & M79.671    Post-Operative Diagnosis:  Same as pre-op diagnosis    [place]  Carole Drummond  [facility] [place]cedure Performed/Technique  Jett Bunionectomy  Modfied Rubi Bunionectomy  Akin Bunionectomy    Specimen/Tissue Removed:  None    Findings:    Hallux Abductovalgus Deformity on Right    Complications:  No    Estimated Blood Loss:  1ml    Procedure:  HEMOSTASIS:  Well-padded pneumatic calf tourniquet, 250 mmHg x 69 minutes.     DRAINS:  None.     COMPLICATIONS:  None.     PREOPERATIVE MEDICATIONS:  2 g Ancef was given IV piggyback prior to tourniquet inflation.     IMPLANTS:  [place] 28, headless self-tapping cannulated screw; 2.5 mm  [place] 28, bone staple     INJECTABLES:   20 mL of 0.25% Marcaine plain and 20 mL of 1% lidocaine plain.     SUTURES:  2-0, 3-0 and 4-0 Vicryl and 4-0 Prolene.     DESCRIPTION OF PROCEDURE:  Written consent was obtained from the patient prior to any medication or sedation being administered.     Under mild sedation the patient was brought to the operating room and placed on the operating table in the supine position.  A well-padded pneumatic calf tourniquet was placed about the patient's Right calf.  Following IV anesthesia, local anesthesia was obtained around the Right first ray utilizing a total of 20 mL of 1 lidocaine plain.  The foot was then scrubbed, prepped and draped in the usual aseptic manner.  An Esmarch bandage was used to exsanguinate the patient's Right foot and the well-padded pneumatic calf tourniquet was inflated to a pressure of 250 mmHg.     Attention was directed to the Right first ray were a 5cm linear longitudinal incision was made medial and parallel to the tendon of the extensor hallucis longus.  This involved the contour of the deformity.  The incision was deepened through subcutaneous tissues using sharp and blunt dissection.  Care was taken to identify  and retract all vital neural and vascular structures.  All bleeders were ligated and cauterized as necessary.     At this time a linear capsulotomy was performed over the medial aspect of the first metatarsophalangeal joint.  The periosteal and capsular structures were then carefully dissected free of their osseous attachments and reflected dorsally and plantarly, thus exposing the head of the first metatarsal at the operative site.     Attention was then directed to the first interspace via the origin incision where the dissection was continued to the level of the fibular sesamoid, which was freed of its soft tissue attachments proximally, laterally, and distally.     Next, using a McGlamry elevator, the plantar lateral and medial capsular and ligamentous and tendinous attachments of the first metatarsal head were freed of their osseous attachments.     At this time the lateral contracture present on the hallux was noted to be reduced and sesamoid apparatus was noted to float in a more corrected medial position.     Attention was redirected to the medial aspect of the first metatarsal head where the osteotomy was performed at the distal metaphysis of the first metatarsal shaft with a plantar osteotomy being cut over the plantar aspect of the contour surface of the metatarsal head.  The vertical osteotomies were cut, making a closing wedge at the apex laterally, with the wedge opening medially to reduce the proximal articular set angle.     At this time following standard AO principles, a [place] 28, headless self-tapping cannulated screw; 2.5 mm, was placed in the capital fragment.  Upon completion of this excellent position of the capital fragments was noted with excellent compression across the osteotomy site.     Attention was then directed to the remaining medial bone shelf, which was resected utilizing an oscillating bone saw and passed from the operative site.  All bony edges were smoothed with a power bur  and bone wax was applied to this area.    Attention was then directed to the medial axis of the hallux.  A linear capsulotomy was performed of the medial aspect of the hallux.  The periosteal and capsular structures were then carefully dissected free of their osseous attachments and reflected dorsally and plantar, thus exposing the proximal phalanx of the hallux.     Next, utilizing an oscillating bone saw, a wedge with the lateral cortex remained intact was utilized to closing a wedge osteotomy.      Next, using a [place] 28 bone staple, was placed across the osteotomy site as to reduce this osteotomy into a more correct position, which the distal hallux was corrected in a rectus position on the proximal hallux and first ray.     The wound was flushed with copious amounts of sterile normal saline.  Redundant capsular tissue was resected as necessary.  The periosteal and capsular structures were then reapproximated and coapted utilizing 2-0 and 3-0 Vicryl.  Subcutaneous tissues were reapproximated and coapted utilizing 4-0 Vicryl, and the skin edges were approximated and coapted utilizing 4-0 Prolene.    The calf tourniquet was deflated with prompt hyperemic response seen to the surgical site and to all toes on the Right foot, with a total tourniquet time of 69 minutes.     Upon completion of the procedure, a postoperative block consisting of 20 mL of 0.25% Marcaine plain was injected about the first ray in a Moura block fashion.     Incision was dressed with Aquacel AG dressing on the 1st ray surgical site with a sterile compressive dressings consisting of 4 x 4's, Kerlix, and Coban.     The patient tolerated the procedure and anesthesia well.  The patient was transferred to the recovery room with vital signs stable and vascular status intact to toes one through five on the Right foot.     The surgery was performed with Quentin Salazar RN, First Assist.  He performed as a first assist throughout the entire case with  retracting, fixation, suturing, and postoperative dressing.    Following a period of postoperative monitoring, the patient will be discharged home, having been given written and oral postoperative instructions.  The patient is to contact Dr. Lam for postoperative followup care and if any problems should arise.

## 2022-05-10 ENCOUNTER — OFFICE VISIT (OUTPATIENT)
Dept: PODIATRY | Facility: CLINIC | Age: 62
End: 2022-05-10

## 2022-05-10 VITALS
HEIGHT: 65 IN | HEART RATE: 91 BPM | TEMPERATURE: 96.9 F | OXYGEN SATURATION: 95 % | DIASTOLIC BLOOD PRESSURE: 55 MMHG | WEIGHT: 184 LBS | BODY MASS INDEX: 30.66 KG/M2 | SYSTOLIC BLOOD PRESSURE: 137 MMHG

## 2022-05-10 DIAGNOSIS — M20.11 HALLUX VALGUS OF RIGHT FOOT: ICD-10-CM

## 2022-05-10 DIAGNOSIS — M79.671 FOOT PAIN, RIGHT: Primary | ICD-10-CM

## 2022-05-10 DIAGNOSIS — E11.9 NON-INSULIN DEPENDENT TYPE 2 DIABETES MELLITUS: ICD-10-CM

## 2022-05-10 DIAGNOSIS — E11.8 DIABETIC FOOT: ICD-10-CM

## 2022-05-10 PROCEDURE — 99024 POSTOP FOLLOW-UP VISIT: CPT | Performed by: PODIATRIST

## 2022-05-10 NOTE — PROGRESS NOTES
[facility] - PODIATRY    Today's Date: 05/10/22    Patient Name: Ronda Bell  MRN: [id number]  CSN: [id number]  PCP: Johnna Mcnair APRN  Referring Provider: No ref. provider found    SUBJECTIVE     Chief Complaint   Patient presents with   • Right Foot - Post-op     HPI: Ronda Bell, a 61 y.o.female, presents to clinic.    Procedure: Right [place] Bunionectomy and modified Venegas bunionectomy along with Akin bunionectomy  Date: 6 May 2022    Patient states they are doing well without complications.  Patient states they are following post-op instructions.  Patient states pain is controlled.      Patient denies any fevers, chills, nausea, vomiting, shortness of breath, nor any other constitutional signs nor symptoms.      No other pedal complaints at this time.    Recent medical changes: None    Past Medical History:   Diagnosis Date   • Arthritis    • Asthma    • Bunion     RIGHT FOOT   • Chest pain     HX OF CHEST TIGHTNESS IN 2019 AND WAS ADMITTED SAW BY DR MACE WAS RELEASED. DENIES CP/SOA. FOLLOWED BY PCP. WORKS FULL TIME   • Corns and callus    • Foot pain, right 10/27/2020   • GERD (gastroesophageal reflux disease)    • Heel pain    • HTN (hypertension)    • Hyperlipidemia    • Ingrown toenail     HX OF NO CURRENT ISSUES   • Numbness in feet     OCC   • Stroke (HCC)     2016 NO RESIDUAL FELT D/T HTN   • TIA (transient ischemic attack)     2019 IN [place] REPORTS NONE SINCE   • Type 2 diabetes mellitus (HCC)     DOES NOT CHECK BS DAILY     Past Surgical History:   Procedure Laterality Date   • ABDOMINAL SURGERY     • BUNIONECTOMY Right 2022    Procedure: BUNIONECTOMY REVERDINE GREEN LAYERED WITH AKIN;  Surgeon: Colby Lam DPM;  Location: [facility] MAIN OR;  Service: Podiatry;  Laterality: Right;   •  SECTION     • COLONOSCOPY     • ENDOSCOPY     • OTHER SURGICAL HISTORY      REPORTED AFTER  SPONGE HAD BEEN LEFT HAD TO HAVE SURGERY TO  REMOVE   • POSTPARTUM TUBAL LIGATION     • TONSILLECTOMY       Family History   Problem Relation Age of Onset   • Heart disease Mother    • Diabetes Mother    • Stroke Brother      Social History     Socioeconomic History   • Marital status:    Tobacco Use   • Smoking status: Former Smoker     Packs/day: 3.00     Types: Cigarettes     Quit date: 2019     Years since quitting: 3.3   • Smokeless tobacco: Never Used   Vaping Use   • Vaping Use: Never used   Substance and Sexual Activity   • Alcohol use: Yes     Comment: OCC   • Drug use: Not Currently     Types: Cocaine(coke)     Comment: LAST USED AROUND 2018   • Sexual activity: Defer     Allergies   Allergen Reactions   • Baclofen Dizziness   • Methocarbamol Dizziness     Current Outpatient Medications   Medication Sig Dispense Refill   • amLODIPine (NORVASC) 10 MG tablet Take 10 mg by mouth Daily.     • aspirin 81 MG EC tablet Take 81 mg by mouth Daily. LAST DOSE OF ASA 5/1/22 PER DR MITCHELL     • atorvastatin (LIPITOR) 20 MG tablet Take 20 mg by mouth Every Night.     • HYDROcodone-acetaminophen (NORCO) 5-325 MG per tablet Take 1-2 tablets by mouth Every 4 (Four) Hours As Needed (Pain). 30 tablet 0   • metFORMIN (GLUCOPHAGE) 500 MG tablet Take 500 mg by mouth 2 (Two) Times a Day With Meals. INSTRUCTED PER ANESTHESIA PROTOCOL     • metoprolol tartrate (LOPRESSOR) 25 MG tablet Take 25 mg by mouth Daily.     • Multiple Vitamins-Minerals (MULTIVITAMIN ADULT EXTRA C PO) multivitamin oral tablet take 1 tablet by oral route daily   Active     • pantoprazole (PROTONIX) 20 MG EC tablet Take 20 mg by mouth Daily.     • promethazine (PHENERGAN) 12.5 MG tablet Take 1 tablet by mouth Every 8 (Eight) Hours As Needed for Nausea or Vomiting. 30 tablet 0     No current facility-administered medications for this visit.     Review of Systems   Constitutional: Negative.    Musculoskeletal:        Postop right first ray bunionectomy   All other systems reviewed and are  negative.      OBJECTIVE     Vitals:    05/10/22 0859   BP: 137/55   Pulse: 91   Temp: 96.9 °F (36.1 °C)   SpO2: 95%       Patient seen in no apparent distress.      PHYSICAL EXAM:     Foot/Ankle Exam:       General:   Appearance: appears stated age and healthy    Orientation: AAOx3    Affect: appropriate    Assistance: crutches    Assistance comment:  Nonweightbearing to right foot.    VASCULAR      Right Foot Vascularity   Normal vascular exam    Dorsalis pedis:  2+  Posterior tibial:  2+  Skin Temperature: warm    Edema Grading:  None  CFT:  < 3 seconds  Pedal Hair Growth:  Present  Varicosities: none       Left Foot Vascularity   Normal vascular exam    Dorsalis pedis:  2+  Posterior tibial:  2+  Skin Temperature: warm    Edema Grading:  None  CFT:  < 3 seconds  Pedal Hair Growth:  Present  Varicosities: none        NEUROLOGIC     Right Foot Neurologic   Normal sensation    Light touch sensation:  Normal  Vibratory sensation:  Normal  Hot/Cold sensation: normal    Protective Sensation using [place] Monofilament:  10     Left Foot Neurologic   Normal sensation    Light touch sensation:  Normal  Vibratory sensation:  Normal  Hot/cold sensation: normal    Protective Sensation using [place] Monofilament:  10     MUSCLE STRENGTH     Left Foot Muscle Strength   Foot dorsiflexion:  5  Foot plantar flexion:  5  Foot inversion:  5  Foot eversion:  5     RANGE OF MOTION      Left Foot Range of Motion   Foot and ankle ROM within normal limits       DERMATOLOGIC     Right Foot Dermatologic   Nails: normal       Left Foot Dermatologic   Skin: skin intact    Nails: normal        Right Foot Additional Comments Right foot shows dressing is dry and intact without signs of breakthrough.  First ray shows sutures intact with skin edges well-coapted with no signs of dehiscence.  Healthy surgical skin edges.  Local postoperative edema.  No drainage present.  No erythema, calor, lymphangitis, nor signs of infection  seen.      RADIOLOGY:      XR Foot 3+ View Right    Result Date: 5/10/2022  Narrative: IN-OFFICE IMAGING:  Standing, weightbearing, 3 view, AP, MO, Lateral, right foot Indication: Postoperative Findings: Radiographs show distal 1st metatarsal shaft osteotomy which shows a bunionectomy with good post-operative position with single screw fixation.  Bone staple seen and proximal phalanx of hallux.  No osteolytic changes seen surrounding screw nor bone stable placement.  No other changes seen compared to previous views.       ASSESSMENT/PLAN     Diagnoses and all orders for this visit:    1. Foot pain, right (Primary)    2. Hallux valgus of right foot    3. Diabetic foot (HCC)    4. Non-insulin dependent type 2 diabetes mellitus (HCC)        Comprehensive lower extremity examination and evaluation was performed.    Discussed findings and treatment plan including risks, benefits, and treatment options with patient in detail. Patient agreed with treatment plan.    Since continue strict nonweightbearing to right foot.  The patient states understanding and agreement with this plan.    An After Visit Summary was printed and given to the patient at discharge, including (if requested) any available informative/educational handouts regarding diagnosis, treatment, or medications. All questions were answered to patient/family satisfaction. Should symptoms fail to improve or worsen they agree to call or return to clinic or to go to the Emergency Department. Discussed the importance of following up with any needed screening tests/labs/specialist appointments and any requested follow-up recommended by me today. Importance of maintaining follow-up discussed and patient accepts that missed appointments can delay diagnosis and potentially lead to worsening of conditions.    Return in about 2 weeks (around 5/24/2022) for Post Operative; x-ray, transition to partial weightbearing with surgical shoe., or sooner if acute issues  arise.    This document has been electronically signed by Colby Lam DPM on May 10, 2022 09:51 EDT

## 2022-05-13 ENCOUNTER — TELEPHONE (OUTPATIENT)
Dept: PODIATRY | Facility: CLINIC | Age: 62
End: 2022-05-13

## 2022-05-13 NOTE — TELEPHONE ENCOUNTER
Pt called stating that her granddaughter stepped on her post op foot yesterday.  Pt stated that foot is swelling more and she heard a pop sound. Pain level has not increased.     Per Dr. Lam, pt needs to treat foot the same as after post op, which is to keep elevated and use ice.  Use crutches, non weight bearing until seen.   Schedule pt for appt early next week for xray.     Spoke with pt and she verbally understand instructions.      [facility] scheduled pt for appt on Monday, 5/17/22.

## 2022-05-16 ENCOUNTER — OFFICE VISIT (OUTPATIENT)
Dept: PODIATRY | Facility: CLINIC | Age: 62
End: 2022-05-16

## 2022-05-16 ENCOUNTER — HOSPITAL ENCOUNTER (OUTPATIENT)
Dept: GENERAL RADIOLOGY | Facility: HOSPITAL | Age: 62
Discharge: HOME | End: 2022-05-16
Admitting: PODIATRIST

## 2022-05-16 VITALS
OXYGEN SATURATION: 100 % | HEART RATE: 81 BPM | TEMPERATURE: 96.7 F | HEIGHT: 65 IN | DIASTOLIC BLOOD PRESSURE: 71 MMHG | SYSTOLIC BLOOD PRESSURE: 144 MMHG | BODY MASS INDEX: 30.66 KG/M2 | WEIGHT: 184 LBS

## 2022-05-16 DIAGNOSIS — M20.11 HALLUX VALGUS OF RIGHT FOOT: Primary | ICD-10-CM

## 2022-05-16 DIAGNOSIS — M79.671 FOOT PAIN, RIGHT: ICD-10-CM

## 2022-05-16 DIAGNOSIS — S90.31XA CONTUSION OF RIGHT FOOT, INITIAL ENCOUNTER: ICD-10-CM

## 2022-05-16 PROCEDURE — 73630 X-RAY EXAM OF FOOT: CPT

## 2022-05-16 PROCEDURE — 99213 OFFICE O/P EST LOW 20 MIN: CPT | Performed by: PODIATRIST

## 2022-05-16 NOTE — PROGRESS NOTES
[facility] - PODIATRY    Today's Date: 22    Patient Name: Ronda Bell  MRN: [id number]  CSN: [id number]  PCP: Johnna Mcnair APRN  Referring Provider: No ref. provider found    SUBJECTIVE     Chief Complaint   Patient presents with   • Right Foot - Post-op Follow-up, Pain     Granddaughter stepped on foot / xray done     HPI: Ronda Bell, a 61 y.o.female, presents to clinic.    Procedure: Right Reverdin-[place] Bunionectomy and modified Venegas bunionectomy along with Akin bunionectomy  Date: 6 May 2022    Patient states her grandchild stepped on her foot on 13 May 2022.  She has maintained nonweightbearing to the surgical foot since that time.    Patient denies any fevers, chills, nausea, vomiting, shortness of breath, nor any other constitutional signs nor symptoms.      Recent medical changes: None    Past Medical History:   Diagnosis Date   • Arthritis    • Asthma    • Bunion     RIGHT FOOT   • Chest pain     HX OF CHEST TIGHTNESS IN 2019 AND WAS ADMITTED SAW BY DR MACE WAS RELEASED. DENIES CP/SOA. FOLLOWED BY PCP. WORKS FULL TIME   • Corns and callus    • Foot pain, right 10/27/2020   • GERD (gastroesophageal reflux disease)    • Heel pain    • HTN (hypertension)    • Hyperlipidemia    • Ingrown toenail     HX OF NO CURRENT ISSUES   • Numbness in feet     OCC   • Stroke (HCC)     2016 NO RESIDUAL FELT D/T HTN   • TIA (transient ischemic attack)     2019 IN [place] REPORTS NONE SINCE   • Type 2 diabetes mellitus (HCC)     DOES NOT CHECK BS DAILY     Past Surgical History:   Procedure Laterality Date   • ABDOMINAL SURGERY     • BUNIONECTOMY Right 2022    Procedure: BUNIONECTOMY REVERDINE GREEN LAYERED WITH AKIN;  Surgeon: Colby Lam DPM;  Location: [facility] MAIN OR;  Service: Podiatry;  Laterality: Right;   •  SECTION     • COLONOSCOPY     • ENDOSCOPY     • OTHER SURGICAL HISTORY      REPORTED AFTER  SPONGE HAD BEEN LEFT HAD TO HAVE  SURGERY TO REMOVE   • POSTPARTUM TUBAL LIGATION     • TONSILLECTOMY       Family History   Problem Relation Age of Onset   • Heart disease Mother    • Diabetes Mother    • Stroke Brother      Social History     Socioeconomic History   • Marital status:    Tobacco Use   • Smoking status: Former Smoker     Packs/day: 3.00     Types: Cigarettes     Quit date: 2019     Years since quitting: 3.3   • Smokeless tobacco: Never Used   Vaping Use   • Vaping Use: Never used   Substance and Sexual Activity   • Alcohol use: Yes     Comment: OCC   • Drug use: Not Currently     Types: Cocaine(coke)     Comment: LAST USED AROUND 2018   • Sexual activity: Defer     Allergies   Allergen Reactions   • Baclofen Dizziness   • Methocarbamol Dizziness     Current Outpatient Medications   Medication Sig Dispense Refill   • amLODIPine (NORVASC) 10 MG tablet Take 10 mg by mouth Daily.     • aspirin 81 MG EC tablet Take 81 mg by mouth Daily. LAST DOSE OF ASA 5/1/22 PER DR MITCHELL     • atorvastatin (LIPITOR) 20 MG tablet Take 20 mg by mouth Every Night.     • HYDROcodone-acetaminophen (NORCO) 5-325 MG per tablet Take 1-2 tablets by mouth Every 4 (Four) Hours As Needed (Pain). 30 tablet 0   • metFORMIN (GLUCOPHAGE) 500 MG tablet Take 500 mg by mouth 2 (Two) Times a Day With Meals. INSTRUCTED PER ANESTHESIA PROTOCOL     • metoprolol tartrate (LOPRESSOR) 25 MG tablet Take 25 mg by mouth Daily.     • Multiple Vitamins-Minerals (MULTIVITAMIN ADULT EXTRA C PO) multivitamin oral tablet take 1 tablet by oral route daily   Active     • pantoprazole (PROTONIX) 20 MG EC tablet Take 20 mg by mouth Daily.     • promethazine (PHENERGAN) 12.5 MG tablet Take 1 tablet by mouth Every 8 (Eight) Hours As Needed for Nausea or Vomiting. 30 tablet 0     No current facility-administered medications for this visit.     Review of Systems   Constitutional: Negative.    Musculoskeletal:        Postop right first ray bunionectomy.  Patient relates a grandchild  stepped on her surgical foot on 13 May 2022   All other systems reviewed and are negative.      OBJECTIVE     Vitals:    05/16/22 1010   BP: 144/71   Pulse: 81   Temp: 96.7 °F (35.9 °C)   SpO2: 100%       Patient seen in no apparent distress.      PHYSICAL EXAM:     Foot/Ankle Exam:       General:   Appearance: appears stated age and healthy    Orientation: AAOx3    Affect: appropriate    Assistance: crutches    Assistance comment:  Nonweightbearing to right foot.    VASCULAR      Right Foot Vascularity   Normal vascular exam    Dorsalis pedis:  2+  Posterior tibial:  2+  Skin Temperature: warm    Edema Grading:  None  CFT:  < 3 seconds  Pedal Hair Growth:  Present  Varicosities: none       Left Foot Vascularity   Normal vascular exam    Dorsalis pedis:  2+  Posterior tibial:  2+  Skin Temperature: warm    Edema Grading:  None  CFT:  < 3 seconds  Pedal Hair Growth:  Present  Varicosities: none        NEUROLOGIC     Right Foot Neurologic   Normal sensation    Light touch sensation:  Normal  Vibratory sensation:  Normal  Hot/Cold sensation: normal    Protective Sensation using [place]-Husam Monofilament:  10     Left Foot Neurologic   Normal sensation    Light touch sensation:  Normal  Vibratory sensation:  Normal  Hot/cold sensation: normal    Protective Sensation using [place]-Husam Monofilament:  10     MUSCLE STRENGTH     Left Foot Muscle Strength   Foot dorsiflexion:  5  Foot plantar flexion:  5  Foot inversion:  5  Foot eversion:  5     RANGE OF MOTION      Left Foot Range of Motion   Foot and ankle ROM within normal limits       DERMATOLOGIC     Right Foot Dermatologic   Nails: normal       Left Foot Dermatologic   Skin: skin intact    Nails: normal        Right Foot Additional Comments Right foot shows compressive  dressing is dry and intact without signs of breakthrough.  First ray shows sutures intact with skin edges well-coapted with no signs of dehiscence.  Healthy surgical skin edges.  Local  postoperative edema.  No drainage present.  No erythema, calor, lymphangitis, nor signs of infection seen.  Right hallux remains in postsurgical rectus position.      RADIOLOGY:      Radiographs performed at [facility] diagnostic imaging have not been read yet by radiology.  Dr. Lam reviewed these and there is no change in hardware position nor osteotomy sites compared to previous views.    ASSESSMENT/PLAN     Diagnoses and all orders for this visit:    1. Hallux valgus of right foot (Primary)  -     XR Foot 3+ View Right    2. Foot pain, right    3. Contusion of right foot, initial encounter        Comprehensive lower extremity examination and evaluation was performed.    Discussed findings and treatment plan including risks, benefits, and treatment options with patient in detail. Patient agreed with treatment plan.    Since continue strict nonweightbearing to right foot.  The patient states understanding and agreement with this plan.    An After Visit Summary was printed and given to the patient at discharge, including (if requested) any available informative/educational handouts regarding diagnosis, treatment, or medications. All questions were answered to patient/family satisfaction. Should symptoms fail to improve or worsen they agree to call or return to clinic or to go to the Emergency Department. Discussed the importance of following up with any needed screening tests/labs/specialist appointments and any requested follow-up recommended by me today. Importance of maintaining follow-up discussed and patient accepts that missed appointments can delay diagnosis and potentially lead to worsening of conditions.    Return in about 8 days (around 5/24/2022) for Post Operative; already made., or sooner if acute issues arise.    This document has been electronically signed by Colby Lam DPM on May 16, 2022 10:25 EDT

## 2022-05-23 ENCOUNTER — TELEPHONE (OUTPATIENT)
Dept: PODIATRY | Facility: CLINIC | Age: 62
End: 2022-05-23

## 2022-05-23 NOTE — TELEPHONE ENCOUNTER
Caller: PATIENT     Relationship to patient: SELF     Best call back number: [phone]    Chief complaint:     Type of visit: POST OP     Requested date: SAME DAY- EARLIER TIME IF POSSIBLE     If rescheduling, when is the original appointment:  05/24/22 2:15P.M.     Additional notes: PT. HAS APPT. TOMORROW AT 2:15 FOR POST OP VISIT FROM SURGERY ON 05/06/22.   SHE HAS AN APPT. IN [place] AT 3:15.  PT. ASKING IF THERE IS ANY WAY THAT SHE CAN COME IN EARLIER TOMORROW.   PLEASE ADVISE.

## 2022-05-24 ENCOUNTER — OFFICE VISIT (OUTPATIENT)
Dept: PODIATRY | Facility: CLINIC | Age: 62
End: 2022-05-24

## 2022-05-24 VITALS
OXYGEN SATURATION: 100 % | HEART RATE: 94 BPM | BODY MASS INDEX: 30.66 KG/M2 | DIASTOLIC BLOOD PRESSURE: 51 MMHG | SYSTOLIC BLOOD PRESSURE: 140 MMHG | WEIGHT: 184 LBS | TEMPERATURE: 96.7 F | HEIGHT: 65 IN

## 2022-05-24 DIAGNOSIS — E11.9 NON-INSULIN DEPENDENT TYPE 2 DIABETES MELLITUS: ICD-10-CM

## 2022-05-24 DIAGNOSIS — M20.11 HALLUX VALGUS OF RIGHT FOOT: Primary | ICD-10-CM

## 2022-05-24 DIAGNOSIS — M79.671 FOOT PAIN, RIGHT: ICD-10-CM

## 2022-05-24 PROCEDURE — 99024 POSTOP FOLLOW-UP VISIT: CPT | Performed by: PODIATRIST

## 2022-05-24 NOTE — PROGRESS NOTES
[facility] - PODIATRY    Today's Date: 22    Patient Name: Ronda Bell  MRN: [id number]  CSN: [id number]  PCP: Johnna Mcnair APRN  Referring Provider: No ref. provider found    SUBJECTIVE     Chief Complaint   Patient presents with   • Right Foot - Post-op Follow-up, Suture / Staple Removal     HPI: Ronda Bell, a 61 y.o.female, presents to clinic.    Procedure: Right Reverdin-[place] Bunionectomy and modified Venegas bunionectomy along with Akin bunionectomy  Date: 6 May 2022    Patient states her grandchild stepped on her foot on 13 May 2022.  She has maintained nonweightbearing to the surgical foot since that time.    Patient denies any fevers, chills, nausea, vomiting, shortness of breath, nor any other constitutional signs nor symptoms.      Recent medical changes: None    Past Medical History:   Diagnosis Date   • Arthritis    • Asthma    • Bunion     RIGHT FOOT   • Chest pain     HX OF CHEST TIGHTNESS IN 2019 AND WAS ADMITTED SAW BY DR MACE WAS RELEASED. DENIES CP/SOA. FOLLOWED BY PCP. WORKS FULL TIME   • Corns and callus    • Foot pain, right 10/27/2020   • GERD (gastroesophageal reflux disease)    • Heel pain    • HTN (hypertension)    • Hyperlipidemia    • Ingrown toenail     HX OF NO CURRENT ISSUES   • Numbness in feet     OCC   • Stroke (HCC)     2016 NO RESIDUAL FELT D/T HTN   • TIA (transient ischemic attack)     2019 IN [place] REPORTS NONE SINCE   • Type 2 diabetes mellitus (HCC)     DOES NOT CHECK BS DAILY     Past Surgical History:   Procedure Laterality Date   • ABDOMINAL SURGERY     • BUNIONECTOMY Right 2022    Procedure: BUNIONECTOMY REVERDINE GREEN LAYERED WITH AKIN;  Surgeon: Colby Lam DPM;  Location: [facility] MAIN OR;  Service: Podiatry;  Laterality: Right;   •  SECTION     • COLONOSCOPY     • ENDOSCOPY     • OTHER SURGICAL HISTORY      REPORTED AFTER  SPONGE HAD BEEN LEFT HAD TO HAVE SURGERY TO REMOVE   • POSTPARTUM  TUBAL LIGATION     • TONSILLECTOMY       Family History   Problem Relation Age of Onset   • Heart disease Mother    • Diabetes Mother    • Stroke Brother      Social History     Socioeconomic History   • Marital status:    Tobacco Use   • Smoking status: Former Smoker     Packs/day: 3.00     Types: Cigarettes     Quit date: 2019     Years since quitting: 3.3   • Smokeless tobacco: Never Used   Vaping Use   • Vaping Use: Never used   Substance and Sexual Activity   • Alcohol use: Yes     Comment: OCC   • Drug use: Not Currently     Types: Cocaine(coke)     Comment: LAST USED AROUND 2018   • Sexual activity: Defer     Allergies   Allergen Reactions   • Baclofen Dizziness   • Methocarbamol Dizziness     Current Outpatient Medications   Medication Sig Dispense Refill   • amLODIPine (NORVASC) 10 MG tablet Take 10 mg by mouth Daily.     • aspirin 81 MG EC tablet Take 81 mg by mouth Daily. LAST DOSE OF ASA 5/1/22 PER DR MITCHELL     • atorvastatin (LIPITOR) 20 MG tablet Take 20 mg by mouth Every Night.     • metFORMIN (GLUCOPHAGE) 500 MG tablet Take 500 mg by mouth 2 (Two) Times a Day With Meals. INSTRUCTED PER ANESTHESIA PROTOCOL     • metoprolol tartrate (LOPRESSOR) 25 MG tablet Take 25 mg by mouth Daily.     • Multiple Vitamins-Minerals (MULTIVITAMIN ADULT EXTRA C PO) multivitamin oral tablet take 1 tablet by oral route daily   Active     • pantoprazole (PROTONIX) 20 MG EC tablet Take 20 mg by mouth Daily.     • promethazine (PHENERGAN) 12.5 MG tablet Take 1 tablet by mouth Every 8 (Eight) Hours As Needed for Nausea or Vomiting. 30 tablet 0     No current facility-administered medications for this visit.     Review of Systems   Constitutional: Negative.    Musculoskeletal:        Postop right first ray bunionectomy.    All other systems reviewed and are negative.      OBJECTIVE     Vitals:    05/24/22 1313   BP: 140/51   Pulse: 94   Temp: 96.7 °F (35.9 °C)   SpO2: 100%       Patient seen in no apparent distress.       PHYSICAL EXAM:     Foot/Ankle Exam:       General:   Appearance: appears stated age and healthy    Orientation: AAOx3    Affect: appropriate    Assistance: crutches    Assistance comment:  Nonweightbearing to right foot.    VASCULAR      Right Foot Vascularity   Normal vascular exam    Dorsalis pedis:  2+  Posterior tibial:  2+  Skin Temperature: warm    Edema Grading:  None  CFT:  < 3 seconds  Pedal Hair Growth:  Present  Varicosities: none       Left Foot Vascularity   Normal vascular exam    Dorsalis pedis:  2+  Posterior tibial:  2+  Skin Temperature: warm    Edema Grading:  None  CFT:  < 3 seconds  Pedal Hair Growth:  Present  Varicosities: none        NEUROLOGIC     Right Foot Neurologic   Normal sensation    Light touch sensation:  Normal  Vibratory sensation:  Normal  Hot/Cold sensation: normal    Protective Sensation using [place] Monofilament:  10     Left Foot Neurologic   Normal sensation    Light touch sensation:  Normal  Vibratory sensation:  Normal  Hot/cold sensation: normal    Protective Sensation using [place] Monofilament:  10     MUSCLE STRENGTH     Left Foot Muscle Strength   Foot dorsiflexion:  5  Foot plantar flexion:  5  Foot inversion:  5  Foot eversion:  5     RANGE OF MOTION      Left Foot Range of Motion   Foot and ankle ROM within normal limits       DERMATOLOGIC     Right Foot Dermatologic   Nails: normal       Left Foot Dermatologic   Skin: skin intact    Nails: normal        Right Foot Additional Comments Right foot shows compressive  dressing is dry and intact without signs of breakthrough.  First ray shows sutures intact with skin edges well-coapted with no signs of dehiscence.  Healthy surgical skin edges.  Local postoperative edema.  No drainage present.  No erythema, calor, lymphangitis, nor signs of infection seen.  Right hallux remains in postsurgical rectus position.    Upon removal of sutures, skin edges remain well-coapted with no signs of  dehiscence.        RADIOLOGY:    XR Foot 3+ View Right    Result Date: 5/24/2022  Narrative: IN-OFFICE IMAGING:  Standing, weightbearing, 3 view, AP, MO, Lateral, right foot Indication: Postoperative Findings: Radiographs show distal 1st metatarsal shaft osteotomy which shows a bunionectomy with good post-operative position with single screw fixation.  Bone staple and osteotomy of proximal phalanx of the hallux is in original postop position.  No osteolytic changes seen around bone staple placement.  No osteolytic changes seen surrounding screw placement.  Early trabeculation seen across osteotomy sites.   No other changes seen compared to previous views.       ASSESSMENT/PLAN     Diagnoses and all orders for this visit:    1. Hallux valgus of right foot (Primary)    2. Non-insulin dependent type 2 diabetes mellitus (HCC)    3. Foot pain, right      Comprehensive lower extremity examination and evaluation was performed.    Discussed findings and treatment plan including risks, benefits, and treatment options with patient in detail. Patient agreed with treatment plan.    Patient may begin partial weightbearing with surgical shoe to right foot as tolerated.  Dr. Lam advised patient may resume driving, but advised not to drive while wearing an ambulatory device.  Advised quick/hard depression of brakes could cause injury to areas.  Also advised of possible legal implications while driving in restrictive device.  The patient states understanding and agreement with these instructions.    An After Visit Summary was printed and given to the patient at discharge, including (if requested) any available informative/educational handouts regarding diagnosis, treatment, or medications. All questions were answered to patient/family satisfaction. Should symptoms fail to improve or worsen they agree to call or return to clinic or to go to the Emergency Department. Discussed the importance of following up with any needed  screening tests/labs/specialist appointments and any requested follow-up recommended by me today. Importance of maintaining follow-up discussed and patient accepts that missed appointments can delay diagnosis and potentially lead to worsening of conditions.    Return in about 3 weeks (around 6/14/2022) for Post-Procedure: X-ray, possible weightbearing without impact., or sooner if acute issues arise.    This document has been electronically signed by Colby Lam DPM on May 24, 2022 13:42 EDT

## 2022-06-02 ENCOUNTER — HOSPITAL ENCOUNTER (OUTPATIENT)
Dept: GENERAL RADIOLOGY | Facility: HOSPITAL | Age: 62
Discharge: HOME | End: 2022-06-02
Admitting: PODIATRIST

## 2022-06-02 ENCOUNTER — OFFICE VISIT (OUTPATIENT)
Dept: PODIATRY | Facility: CLINIC | Age: 62
End: 2022-06-02

## 2022-06-02 VITALS
TEMPERATURE: 97.9 F | DIASTOLIC BLOOD PRESSURE: 74 MMHG | HEART RATE: 74 BPM | BODY MASS INDEX: 30.66 KG/M2 | HEIGHT: 65 IN | SYSTOLIC BLOOD PRESSURE: 130 MMHG | WEIGHT: 184 LBS | OXYGEN SATURATION: 96 %

## 2022-06-02 DIAGNOSIS — T81.30XA WOUND DEHISCENCE: Primary | ICD-10-CM

## 2022-06-02 DIAGNOSIS — S90.31XA CONTUSION OF RIGHT FOOT, INITIAL ENCOUNTER: ICD-10-CM

## 2022-06-02 DIAGNOSIS — M20.12 VALGUS DEFORMITY OF BOTH GREAT TOES: ICD-10-CM

## 2022-06-02 DIAGNOSIS — M20.11 VALGUS DEFORMITY OF BOTH GREAT TOES: ICD-10-CM

## 2022-06-02 DIAGNOSIS — E11.8 DIABETIC FOOT: ICD-10-CM

## 2022-06-02 DIAGNOSIS — M79.671 FOOT PAIN, RIGHT: ICD-10-CM

## 2022-06-02 DIAGNOSIS — M20.11 HALLUX VALGUS OF RIGHT FOOT: ICD-10-CM

## 2022-06-02 DIAGNOSIS — E11.9 NON-INSULIN DEPENDENT TYPE 2 DIABETES MELLITUS: ICD-10-CM

## 2022-06-02 PROCEDURE — 73630 X-RAY EXAM OF FOOT: CPT

## 2022-06-02 PROCEDURE — 99024 POSTOP FOLLOW-UP VISIT: CPT | Performed by: PODIATRIST

## 2022-06-02 RX ORDER — SODIUM CHLORIDE 146 MG/ML
1 INJECTION, SOLUTION INTRAVENOUS DAILY
Qty: 1000 ML | Refills: 5 | Status: SHIPPED | OUTPATIENT
Start: 2022-06-02 | End: 2022-06-06 | Stop reason: SDUPTHER

## 2022-06-02 RX ORDER — DOXYCYCLINE HYCLATE 100 MG/1
100 CAPSULE ORAL 2 TIMES DAILY
Qty: 28 CAPSULE | Refills: 0 | Status: SHIPPED | OUTPATIENT
Start: 2022-06-02 | End: 2022-06-16

## 2022-06-02 NOTE — PROGRESS NOTES
"    [facility] - PODIATRY    Today's Date: 22    Patient Name: Ronda Bell  MRN: [id number]  CSN: [id number]  PCP: Johnna Mcnair APRN  Referring Provider: No ref. provider found    SUBJECTIVE     Chief Complaint   Patient presents with   • Right Foot - Pain, Post-op Follow-up     Morrison a pop while at the gym / had xray 2022     HPI: Ronda Bell, a 61 y.o.female, presents to clinic.     Patient contacted the office stating that she was working out at the gym and felt a \"pop\" in her surgical foot.  States she was wearing her surgical shoe at the time.    Procedure: Right [place] Bunionectomy and modified Venegas bunionectomy along with Akin bunionectomy  Date: 6 May 2022    Patient denies any fevers, chills, nausea, vomiting, shortness of breath, nor any other constitutional signs nor symptoms.      Recent medical changes: None    Past Medical History:   Diagnosis Date   • Arthritis    • Asthma    • Bunion     RIGHT FOOT   • Chest pain     HX OF CHEST TIGHTNESS IN 2019 AND WAS ADMITTED SAW BY DR MACE WAS RELEASED. DENIES CP/SOA. FOLLOWED BY PCP. WORKS FULL TIME   • Corns and callus    • Foot pain, right 10/27/2020   • GERD (gastroesophageal reflux disease)    • Heel pain    • HTN (hypertension)    • Hyperlipidemia    • Ingrown toenail     HX OF NO CURRENT ISSUES   • Numbness in feet     OCC   • Stroke (HCC)     2016 NO RESIDUAL FELT D/T HTN   • TIA (transient ischemic attack)     2019 IN [place] REPORTS NONE SINCE   • Type 2 diabetes mellitus (HCC)     DOES NOT CHECK BS DAILY     Past Surgical History:   Procedure Laterality Date   • ABDOMINAL SURGERY     • BUNIONECTOMY Right 2022    Procedure: BUNIONECTOMY REVERDINE GREEN LAYERED WITH AKIN;  Surgeon: Colby Lam DPM;  Location: [facility] MAIN OR;  Service: Podiatry;  Laterality: Right;   •  SECTION     • COLONOSCOPY     • ENDOSCOPY     • OTHER SURGICAL HISTORY      REPORTED AFTER  " SPONGE HAD BEEN LEFT HAD TO HAVE SURGERY TO REMOVE   • POSTPARTUM TUBAL LIGATION     • TONSILLECTOMY       Family History   Problem Relation Age of Onset   • Heart disease Mother    • Diabetes Mother    • Stroke Brother      Social History     Socioeconomic History   • Marital status:    Tobacco Use   • Smoking status: Former Smoker     Packs/day: 3.00     Types: Cigarettes     Quit date: 2019     Years since quitting: 3.4   • Smokeless tobacco: Never Used   Vaping Use   • Vaping Use: Never used   Substance and Sexual Activity   • Alcohol use: Yes     Comment: OCC   • Drug use: Not Currently     Types: Cocaine(coke)     Comment: LAST USED AROUND 2018   • Sexual activity: Defer     Allergies   Allergen Reactions   • Baclofen Dizziness   • Methocarbamol Dizziness     Current Outpatient Medications   Medication Sig Dispense Refill   • amLODIPine (NORVASC) 10 MG tablet Take 10 mg by mouth Daily.     • aspirin 81 MG EC tablet Take 81 mg by mouth Daily. LAST DOSE OF ASA 5/1/22 PER DR MITCHELL     • atorvastatin (LIPITOR) 20 MG tablet Take 20 mg by mouth Every Night.     • metFORMIN (GLUCOPHAGE) 500 MG tablet Take 500 mg by mouth 2 (Two) Times a Day With Meals. INSTRUCTED PER ANESTHESIA PROTOCOL     • metoprolol tartrate (LOPRESSOR) 25 MG tablet Take 25 mg by mouth Daily.     • Multiple Vitamins-Minerals (MULTIVITAMIN ADULT EXTRA C PO) multivitamin oral tablet take 1 tablet by oral route daily   Active     • pantoprazole (PROTONIX) 20 MG EC tablet Take 20 mg by mouth Daily.     • promethazine (PHENERGAN) 12.5 MG tablet Take 1 tablet by mouth Every 8 (Eight) Hours As Needed for Nausea or Vomiting. 30 tablet 0   • doxycycline (VIBRAMYCIN) 100 MG capsule Take 1 capsule by mouth 2 (Two) Times a Day for 14 days. 28 capsule 0   • sodium chloride 2.5 MEQ/ML injection Apply 0.4 mL topically to the appropriate area as directed Daily for 30 days. Please dispense one litre bottle of 0.9% saline solution 1000 mL 5     No  current facility-administered medications for this visit.     Review of Systems   Constitutional: Negative.    Musculoskeletal:        Postop right first ray bunionectomy.  Patient brought in to evaluate for an injury she sustained at the gym.   All other systems reviewed and are negative.      OBJECTIVE     Vitals:    06/02/22 1305   BP: 130/74   Pulse: 74   Temp: 97.9 °F (36.6 °C)   SpO2: 96%       Patient seen in no apparent distress.      PHYSICAL EXAM:     Foot/Ankle Exam:       General:   Appearance: appears stated age and healthy    Orientation: AAOx3    Affect: appropriate    Shoes: Surgical shoe on right foot.    VASCULAR      Right Foot Vascularity   Normal vascular exam    Dorsalis pedis:  2+  Posterior tibial:  2+  Skin Temperature: warm    Edema Grading:  None  CFT:  < 3 seconds  Pedal Hair Growth:  Present  Varicosities: none       Left Foot Vascularity   Normal vascular exam    Dorsalis pedis:  2+  Posterior tibial:  2+  Skin Temperature: warm    Edema Grading:  None  CFT:  < 3 seconds  Pedal Hair Growth:  Present  Varicosities: none        NEUROLOGIC     Right Foot Neurologic   Normal sensation    Light touch sensation:  Normal  Vibratory sensation:  Normal  Hot/Cold sensation: normal    Protective Sensation using [place] Monofilament:  10     Left Foot Neurologic   Normal sensation    Light touch sensation:  Normal  Vibratory sensation:  Normal  Hot/cold sensation: normal    Protective Sensation using [place] Monofilament:  10     MUSCLE STRENGTH     Left Foot Muscle Strength   Foot dorsiflexion:  5  Foot plantar flexion:  5  Foot inversion:  5  Foot eversion:  5     RANGE OF MOTION      Left Foot Range of Motion   Foot and ankle ROM within normal limits       DERMATOLOGIC     Right Foot Dermatologic   Nails: normal       Left Foot Dermatologic   Skin: skin intact    Nails: normal        Right Foot Additional Comments Right foot shows gauze dressing dry and intact.  There was  blood-tinged serous drainage on the dressing upon removal.  He is well coapted surgical site now shows wound dehiscence proximally and distally along the suture line.  Please see attached picture.  Small amount of serous drainage.  No signs of edema, erythema, lymphangitis, nor signs of infection.  Normal saline wet-to-dry dressing was applied to this area and the patient instructed on how to perform this at home and daily.        RADIOLOGY:    XR Foot 3+ View Right    Result Date: 6/2/2022  Narrative: PROCEDURE: XR FOOT 3+ VW RIGHT  COMPARISON: [facility], CR, XR FOOT 3+ VW RIGHT, 5/10/2022, 9:53.  [facility], CR, XR FOOT 3+ VW RIGHT, 4/25/2022, 15:55.  [facility], CR, XR FOOT 3+ VW RIGHT, 5/24/2022, 14:05.  INDICATIONS: FOLLOW UP FOR RIGHT FOOT SURGERY 5-6-22  FINDINGS:  There are postoperative changes involving the 1st digit.  There appears to have been osteotomy of the proximal phalanx with a medial fixation device.  There is persistent transverse linear lucency without significant bridging fusion seen at this time.  There is also a fixation screw in the distal 1st metatarsal with findings of bunionectomy.  No definite new hardware complication is seen.  There is mild hallux valgus, as before.  There is soft tissue prominence of the medial forefoot, as before.  No definite new osseous abnormality is seen.  There are degenerative changes in the foot, as before.      Impression:   1. Postoperative changes involving the 1st digit with evidence of bunionectomy and osteotomy of the 1st proximal phalanx.  Hardware components appear unchanged. 2. Osteotomy line remains visible. 3. Medial forefoot soft tissue prominence.       CESILIA GIL MD       Electronically Signed and Approved By: CESILIA GIL MD on 6/02/2022 at 13:53             ASSESSMENT/PLAN     Diagnoses and all orders for this visit:    1. Wound dehiscence (Primary)  -     doxycycline (VIBRAMYCIN) 100  MG capsule; Take 1 capsule by mouth 2 (Two) Times a Day for 14 days.  Dispense: 28 capsule; Refill: 0  -     sodium chloride 2.5 MEQ/ML injection; Apply 0.4 mL topically to the appropriate area as directed Daily for 30 days. Please dispense one litre bottle of 0.9% saline solution  Dispense: 1000 mL; Refill: 5    2. Hallux valgus of right foot    3. Foot pain, right    4. Diabetic foot (HCC)    5. Valgus deformity of both great toes    6. Contusion of right foot, initial encounter    7. Non-insulin dependent type 2 diabetes mellitus (HCC)      Comprehensive lower extremity examination and evaluation was performed.    Discussed findings and treatment plan including risks, benefits, and treatment options with patient in detail. Patient agreed with treatment plan.    Wound care:  right first ray wound dehiscence site; normal saline wet-to-dry dressing daily.    Patient may begin partial weightbearing with surgical shoe to right foot as tolerated.  Dr. Lam advised patient may resume driving, but advised not to drive while wearing an ambulatory device.  Advised quick/hard depression of brakes could cause injury to areas.  Also advised of possible legal implications while driving in restrictive device.  The patient states understanding and agreement with these instructions.    An After Visit Summary was printed and given to the patient at discharge, including (if requested) any available informative/educational handouts regarding diagnosis, treatment, or medications. All questions were answered to patient/family satisfaction. Should symptoms fail to improve or worsen they agree to call or return to clinic or to go to the Emergency Department. Discussed the importance of following up with any needed screening tests/labs/specialist appointments and any requested follow-up recommended by me today. Importance of maintaining follow-up discussed and patient accepts that missed appointments can delay diagnosis and  potentially lead to worsening of conditions.    Return in about 12 days (around 6/14/2022) for Post Operative., or sooner if acute issues arise.    This document has been electronically signed by Colby Lam DPM on June 2, 2022 14:27 EDT

## 2022-06-06 DIAGNOSIS — T81.30XA WOUND DEHISCENCE: ICD-10-CM

## 2022-06-06 RX ORDER — SODIUM CHLORIDE 146 MG/ML
1 INJECTION, SOLUTION INTRAVENOUS DAILY
Qty: 1000 ML | Refills: 5 | Status: SHIPPED | OUTPATIENT
Start: 2022-06-06 | End: 2022-07-06

## 2022-06-14 ENCOUNTER — OFFICE VISIT (OUTPATIENT)
Dept: PODIATRY | Facility: CLINIC | Age: 62
End: 2022-06-14

## 2022-06-14 VITALS
WEIGHT: 184 LBS | TEMPERATURE: 97.3 F | HEART RATE: 75 BPM | SYSTOLIC BLOOD PRESSURE: 124 MMHG | HEIGHT: 65 IN | DIASTOLIC BLOOD PRESSURE: 64 MMHG | OXYGEN SATURATION: 99 % | BODY MASS INDEX: 30.66 KG/M2

## 2022-06-14 DIAGNOSIS — M20.12 VALGUS DEFORMITY OF BOTH GREAT TOES: ICD-10-CM

## 2022-06-14 DIAGNOSIS — M20.11 HALLUX VALGUS OF RIGHT FOOT: ICD-10-CM

## 2022-06-14 DIAGNOSIS — E11.9 NON-INSULIN DEPENDENT TYPE 2 DIABETES MELLITUS: ICD-10-CM

## 2022-06-14 DIAGNOSIS — M20.11 VALGUS DEFORMITY OF BOTH GREAT TOES: ICD-10-CM

## 2022-06-14 DIAGNOSIS — E11.8 DIABETIC FOOT: ICD-10-CM

## 2022-06-14 DIAGNOSIS — T81.30XA WOUND DEHISCENCE: Primary | ICD-10-CM

## 2022-06-14 DIAGNOSIS — M79.671 FOOT PAIN, RIGHT: ICD-10-CM

## 2022-06-14 PROCEDURE — 99024 POSTOP FOLLOW-UP VISIT: CPT | Performed by: PODIATRIST

## 2022-07-05 ENCOUNTER — OFFICE VISIT (OUTPATIENT)
Dept: PODIATRY | Facility: CLINIC | Age: 62
End: 2022-07-05

## 2022-07-05 VITALS
HEART RATE: 83 BPM | TEMPERATURE: 97.6 F | OXYGEN SATURATION: 99 % | BODY MASS INDEX: 31.32 KG/M2 | HEIGHT: 65 IN | DIASTOLIC BLOOD PRESSURE: 58 MMHG | SYSTOLIC BLOOD PRESSURE: 138 MMHG | WEIGHT: 188 LBS

## 2022-07-05 DIAGNOSIS — M20.12 VALGUS DEFORMITY OF BOTH GREAT TOES: ICD-10-CM

## 2022-07-05 DIAGNOSIS — T81.30XA WOUND DEHISCENCE: Primary | ICD-10-CM

## 2022-07-05 DIAGNOSIS — M20.11 HALLUX VALGUS OF RIGHT FOOT: ICD-10-CM

## 2022-07-05 DIAGNOSIS — M79.671 FOOT PAIN, RIGHT: ICD-10-CM

## 2022-07-05 DIAGNOSIS — M20.11 VALGUS DEFORMITY OF BOTH GREAT TOES: ICD-10-CM

## 2022-07-05 DIAGNOSIS — E11.9 NON-INSULIN DEPENDENT TYPE 2 DIABETES MELLITUS: ICD-10-CM

## 2022-07-05 DIAGNOSIS — E11.8 DIABETIC FOOT: ICD-10-CM

## 2022-07-05 PROCEDURE — 99024 POSTOP FOLLOW-UP VISIT: CPT | Performed by: PODIATRIST

## 2022-07-05 NOTE — PROGRESS NOTES
[facility] - PODIATRY    Today's Date: 22    Patient Name: Ronda Bell  MRN: [id number]  CSN: [id number]  PCP: Johnna Mcnair APRN  Referring Provider: No ref. provider found    SUBJECTIVE     Chief Complaint   Patient presents with   • Right Foot - Post-op, Follow-up     HPI: Ronda Bell, a 62 y.o.female, presents to clinic.     She states her right first MPJ wound looks and feels much better than previous visit.  Patient states there is no drainage to the surgical site area.  Patient states he is using over-the-counter skin lotion on this area currently.    Procedure: Right Reverdin-[place] Bunionectomy and modified Venegas bunionectomy along with Akin bunionectomy  Date: 6 May 2022    Patient denies any fevers, chills, nausea, vomiting, shortness of breath, nor any other constitutional signs nor symptoms.      Recent medical changes: None    Past Medical History:   Diagnosis Date   • Arthritis    • Asthma    • Bunion     RIGHT FOOT   • Chest pain     HX OF CHEST TIGHTNESS IN 2019 AND WAS ADMITTED SAW BY DR MACE WAS RELEASED. DENIES CP/SOA. FOLLOWED BY PCP. WORKS FULL TIME   • Corns and callus    • Foot pain, right 10/27/2020   • GERD (gastroesophageal reflux disease)    • Heel pain    • HTN (hypertension)    • Hyperlipidemia    • Ingrown toenail     HX OF NO CURRENT ISSUES   • Numbness in feet     OCC   • Stroke (HCC)     2016 NO RESIDUAL FELT D/T HTN   • TIA (transient ischemic attack)     [facility]   • Type 2 diabetes mellitus (HCC)     DOES NOT CHECK BS DAILY     Past Surgical History:   Procedure Laterality Date   • ABDOMINAL SURGERY     • BUNIONECTOMY Right 2022    Procedure: BUNIONECTOMY REVERDINE GREEN LAYERED WITH AKIN;  Surgeon: Colby Lam DPM;  Location: [facility] MAIN OR;  Service: Podiatry;  Laterality: Right;   •  SECTION     • COLONOSCOPY     • ENDOSCOPY     • OTHER SURGICAL HISTORY      REPORTED AFTER   SPONGE HAD BEEN LEFT HAD TO HAVE SURGERY TO REMOVE   • POSTPARTUM TUBAL LIGATION     • TONSILLECTOMY       Family History   Problem Relation Age of Onset   • Heart disease Mother    • Diabetes Mother    • Stroke Brother      Social History     Socioeconomic History   • Marital status:    Tobacco Use   • Smoking status: Former Smoker     Packs/day: 3.00     Types: Cigarettes     Quit date:      Years since quitting: 3.5   • Smokeless tobacco: Never Used   Vaping Use   • Vaping Use: Never used   Substance and Sexual Activity   • Alcohol use: Yes     Comment: OCC   • Drug use: Not Currently     Types: Cocaine(coke)     Comment: LAST USED AROUND    • Sexual activity: Defer     Allergies   Allergen Reactions   • Baclofen Dizziness   • Methocarbamol Dizziness     Current Outpatient Medications   Medication Sig Dispense Refill   • amLODIPine (NORVASC) 10 MG tablet Take 10 mg by mouth Daily.     • aspirin 81 MG EC tablet Take 81 mg by mouth Daily. LAST DOSE OF ASA 22 PER DR MITCHELL     • atorvastatin (LIPITOR) 20 MG tablet Take 20 mg by mouth Every Night.     • metFORMIN (GLUCOPHAGE) 500 MG tablet Take 500 mg by mouth 2 (Two) Times a Day With Meals. INSTRUCTED PER ANESTHESIA PROTOCOL     • metoprolol tartrate (LOPRESSOR) 25 MG tablet Take 25 mg by mouth Daily.     • Multiple Vitamins-Minerals (MULTIVITAMIN ADULT EXTRA C PO) multivitamin oral tablet take 1 tablet by oral route daily   Active     • pantoprazole (PROTONIX) 20 MG EC tablet Take 20 mg by mouth Daily.     • promethazine (PHENERGAN) 12.5 MG tablet Take 1 tablet by mouth Every 8 (Eight) Hours As Needed for Nausea or Vomiting. 30 tablet 0   • sodium chloride 2.5 MEQ/ML injection Apply 0.4 mL topically to the appropriate area as directed Daily for 30 days. Please dispense one litre bottle of 0.9% saline solution 1000 mL 5     No current facility-administered medications for this visit.     Review of Systems   Constitutional: Negative.     Musculoskeletal:        Postop right first ray bunionectomy.    All other systems reviewed and are negative.      OBJECTIVE     Vitals:    07/05/22 1540   BP: 138/58   Pulse: 83   Temp: 97.6 °F (36.4 °C)   SpO2: 99%       Patient seen in no apparent distress.      PHYSICAL EXAM:     Foot/Ankle Exam:       General:   Appearance: appears stated age and healthy    Orientation: AAOx3    Affect: appropriate    Shoes: Surgical shoe on right foot.    VASCULAR      Right Foot Vascularity   Normal vascular exam    Dorsalis pedis:  2+  Posterior tibial:  2+  Skin Temperature: warm    Edema Grading:  None  CFT:  < 3 seconds  Pedal Hair Growth:  Present  Varicosities: none       Left Foot Vascularity   Normal vascular exam    Dorsalis pedis:  2+  Posterior tibial:  2+  Skin Temperature: warm    Edema Grading:  None  CFT:  < 3 seconds  Pedal Hair Growth:  Present  Varicosities: none        NEUROLOGIC     Right Foot Neurologic   Normal sensation    Light touch sensation:  Normal  Vibratory sensation:  Normal  Hot/Cold sensation: normal    Protective Sensation using [place] Monofilament:  10     Left Foot Neurologic   Normal sensation    Light touch sensation:  Normal  Vibratory sensation:  Normal  Hot/cold sensation: normal    Protective Sensation using [place] Monofilament:  10     MUSCLE STRENGTH     Left Foot Muscle Strength   Foot dorsiflexion:  5  Foot plantar flexion:  5  Foot inversion:  5  Foot eversion:  5     RANGE OF MOTION      Left Foot Range of Motion   Foot and ankle ROM within normal limits       DERMATOLOGIC     Right Foot Dermatologic   Nails: normal       Left Foot Dermatologic   Skin: skin intact    Nails: normal        Right Foot Additional Comments Right foot first ray surgical site shows no drainage.  No open wound.  Healthy keratinization along the entire surgical site.  No signs of edema, erythema, lymphangitis, nor signs of infection.        ASSESSMENT/PLAN     Diagnoses and all  orders for this visit:    1. Wound dehiscence (Primary)    2. Hallux valgus of right foot    3. Diabetic foot (HCC)    4. Non-insulin dependent type 2 diabetes mellitus (HCC)    5. Valgus deformity of both great toes    6. Foot pain, right      Comprehensive lower extremity examination and evaluation was performed.    Discussed findings and treatment plan including risks, benefits, and treatment options with patient in detail. Patient agreed with treatment plan.    Patient may begin to weight bear as tolerated in supportive shoes.  No impact activities for one month.  After that time, the patient may increase activities as tolerated.  Patient states understanding and agreement with this plan.    Patient is discharged from follow-up from the surgery.  The patient states understanding and agreement with this plan.    An After Visit Summary was printed and given to the patient at discharge, including (if requested) any available informative/educational handouts regarding diagnosis, treatment, or medications. All questions were answered to patient/family satisfaction. Should symptoms fail to improve or worsen they agree to call or return to clinic or to go to the Emergency Department. Discussed the importance of following up with any needed screening tests/labs/specialist appointments and any requested follow-up recommended by me today. Importance of maintaining follow-up discussed and patient accepts that missed appointments can delay diagnosis and potentially lead to worsening of conditions.    Return in about 1 year (around 7/5/2023) for Podiatry Diabetic Foot Exam., or sooner if acute issues arise.    This document has been electronically signed by Colby Lam DPM on July 5, 2022 16:22 EDT

## 2022-10-12 ENCOUNTER — HOSPITAL ENCOUNTER (EMERGENCY)
Facility: HOSPITAL | Age: 62
Discharge: HOME | End: 2022-10-12
Attending: EMERGENCY MEDICINE | Admitting: EMERGENCY MEDICINE

## 2022-10-12 ENCOUNTER — APPOINTMENT (OUTPATIENT)
Dept: CT IMAGING | Facility: HOSPITAL | Age: 62
End: 2022-10-12

## 2022-10-12 VITALS
TEMPERATURE: 98.3 F | HEIGHT: 65 IN | SYSTOLIC BLOOD PRESSURE: 148 MMHG | HEART RATE: 74 BPM | BODY MASS INDEX: 31.11 KG/M2 | OXYGEN SATURATION: 99 % | DIASTOLIC BLOOD PRESSURE: 66 MMHG | WEIGHT: 186.73 LBS | RESPIRATION RATE: 19 BRPM

## 2022-10-12 DIAGNOSIS — K57.92 DIVERTICULITIS: Primary | ICD-10-CM

## 2022-10-12 DIAGNOSIS — R51.9 HEADACHE, UNSPECIFIED HEADACHE TYPE: ICD-10-CM

## 2022-10-12 DIAGNOSIS — R93.0 ABNORMAL HEAD CT: ICD-10-CM

## 2022-10-12 LAB
ALBUMIN SERPL-MCNC: 4.3 G/DL (ref 3.5–5.2)
ALBUMIN/GLOB SERPL: 1.3 G/DL
ALP SERPL-CCNC: 145 U/L (ref 39–117)
ALT SERPL W P-5'-P-CCNC: 16 U/L (ref 1–33)
ANION GAP SERPL CALCULATED.3IONS-SCNC: 7.8 MMOL/L (ref 5–15)
AST SERPL-CCNC: 16 U/L (ref 1–32)
BACTERIA UR QL AUTO: ABNORMAL /HPF
BASOPHILS # BLD AUTO: 0.02 10*3/MM3 (ref 0–0.2)
BASOPHILS NFR BLD AUTO: 0.2 % (ref 0–1.5)
BILIRUB SERPL-MCNC: 0.3 MG/DL (ref 0–1.2)
BILIRUB UR QL STRIP: NEGATIVE
BUN SERPL-MCNC: 11 MG/DL (ref 8–23)
BUN/CREAT SERPL: 12.6 (ref 7–25)
CALCIUM SPEC-SCNC: 9.3 MG/DL (ref 8.6–10.5)
CHLORIDE SERPL-SCNC: 104 MMOL/L (ref 98–107)
CLARITY UR: CLEAR
CO2 SERPL-SCNC: 29.2 MMOL/L (ref 22–29)
COLOR UR: YELLOW
CREAT SERPL-MCNC: 0.87 MG/DL (ref 0.57–1)
D-LACTATE SERPL-SCNC: 0.6 MMOL/L (ref 0.5–2)
DEPRECATED RDW RBC AUTO: 40 FL (ref 37–54)
EGFRCR SERPLBLD CKD-EPI 2021: 75.4 ML/MIN/1.73
EOSINOPHIL # BLD AUTO: 0.25 10*3/MM3 (ref 0–0.4)
EOSINOPHIL NFR BLD AUTO: 2.9 % (ref 0.3–6.2)
ERYTHROCYTE [DISTWIDTH] IN BLOOD BY AUTOMATED COUNT: 12.6 % (ref 12.3–15.4)
GLOBULIN UR ELPH-MCNC: 3.3 GM/DL
GLUCOSE BLDC GLUCOMTR-MCNC: 76 MG/DL (ref 70–99)
GLUCOSE SERPL-MCNC: 83 MG/DL (ref 65–99)
GLUCOSE UR STRIP-MCNC: NEGATIVE MG/DL
HCT VFR BLD AUTO: 42.7 % (ref 34–46.6)
HGB BLD-MCNC: 14.4 G/DL (ref 12–15.9)
HGB UR QL STRIP.AUTO: ABNORMAL
HOLD SPECIMEN: NORMAL
HOLD SPECIMEN: NORMAL
HYALINE CASTS UR QL AUTO: ABNORMAL /LPF
IMM GRANULOCYTES # BLD AUTO: 0.02 10*3/MM3 (ref 0–0.05)
IMM GRANULOCYTES NFR BLD AUTO: 0.2 % (ref 0–0.5)
KETONES UR QL STRIP: NEGATIVE
LEUKOCYTE ESTERASE UR QL STRIP.AUTO: ABNORMAL
LIPASE SERPL-CCNC: 64 U/L (ref 13–60)
LYMPHOCYTES # BLD AUTO: 3.06 10*3/MM3 (ref 0.7–3.1)
LYMPHOCYTES NFR BLD AUTO: 35.7 % (ref 19.6–45.3)
MCH RBC QN AUTO: 29.5 PG (ref 26.6–33)
MCHC RBC AUTO-ENTMCNC: 33.7 G/DL (ref 31.5–35.7)
MCV RBC AUTO: 87.5 FL (ref 79–97)
MONOCYTES # BLD AUTO: 0.45 10*3/MM3 (ref 0.1–0.9)
MONOCYTES NFR BLD AUTO: 5.2 % (ref 5–12)
NEUTROPHILS NFR BLD AUTO: 4.78 10*3/MM3 (ref 1.7–7)
NEUTROPHILS NFR BLD AUTO: 55.8 % (ref 42.7–76)
NITRITE UR QL STRIP: NEGATIVE
NRBC BLD AUTO-RTO: 0 /100 WBC (ref 0–0.2)
PH UR STRIP.AUTO: 5.5 [PH] (ref 5–8)
PLATELET # BLD AUTO: 334 10*3/MM3 (ref 140–450)
PMV BLD AUTO: 9.1 FL (ref 6–12)
POTASSIUM SERPL-SCNC: 4.4 MMOL/L (ref 3.5–5.2)
PROT SERPL-MCNC: 7.6 G/DL (ref 6–8.5)
PROT UR QL STRIP: NEGATIVE
RBC # BLD AUTO: 4.88 10*6/MM3 (ref 3.77–5.28)
RBC # UR STRIP: ABNORMAL /HPF
REF LAB TEST METHOD: ABNORMAL
SODIUM SERPL-SCNC: 141 MMOL/L (ref 136–145)
SP GR UR STRIP: 1.02 (ref 1–1.03)
SQUAMOUS #/AREA URNS HPF: ABNORMAL /HPF
UROBILINOGEN UR QL STRIP: ABNORMAL
WBC # UR STRIP: ABNORMAL /HPF
WBC NRBC COR # BLD: 8.58 10*3/MM3 (ref 3.4–10.8)
WHOLE BLOOD HOLD COAG: NORMAL
WHOLE BLOOD HOLD SPECIMEN: NORMAL

## 2022-10-12 PROCEDURE — 25010000002 METOCLOPRAMIDE PER 10 MG: Performed by: EMERGENCY MEDICINE

## 2022-10-12 PROCEDURE — 25010000002 KETOROLAC TROMETHAMINE PER 15 MG: Performed by: EMERGENCY MEDICINE

## 2022-10-12 PROCEDURE — 36415 COLL VENOUS BLD VENIPUNCTURE: CPT

## 2022-10-12 PROCEDURE — 25010000002 DIPHENHYDRAMINE PER 50 MG: Performed by: EMERGENCY MEDICINE

## 2022-10-12 PROCEDURE — 80053 COMPREHEN METABOLIC PANEL: CPT

## 2022-10-12 PROCEDURE — 74177 CT ABD & PELVIS W/CONTRAST: CPT

## 2022-10-12 PROCEDURE — 82962 GLUCOSE BLOOD TEST: CPT

## 2022-10-12 PROCEDURE — 99283 EMERGENCY DEPT VISIT LOW MDM: CPT

## 2022-10-12 PROCEDURE — 85025 COMPLETE CBC W/AUTO DIFF WBC: CPT

## 2022-10-12 PROCEDURE — 0 IOPAMIDOL PER 1 ML: Performed by: EMERGENCY MEDICINE

## 2022-10-12 PROCEDURE — 96375 TX/PRO/DX INJ NEW DRUG ADDON: CPT

## 2022-10-12 PROCEDURE — 83605 ASSAY OF LACTIC ACID: CPT

## 2022-10-12 PROCEDURE — 81001 URINALYSIS AUTO W/SCOPE: CPT

## 2022-10-12 PROCEDURE — 70450 CT HEAD/BRAIN W/O DYE: CPT

## 2022-10-12 PROCEDURE — 83690 ASSAY OF LIPASE: CPT

## 2022-10-12 PROCEDURE — 96374 THER/PROPH/DIAG INJ IV PUSH: CPT

## 2022-10-12 RX ORDER — KETOROLAC TROMETHAMINE 30 MG/ML
30 INJECTION, SOLUTION INTRAMUSCULAR; INTRAVENOUS ONCE
Status: COMPLETED | OUTPATIENT
Start: 2022-10-12 | End: 2022-10-12

## 2022-10-12 RX ORDER — METOCLOPRAMIDE HYDROCHLORIDE 5 MG/ML
10 INJECTION INTRAMUSCULAR; INTRAVENOUS ONCE
Status: COMPLETED | OUTPATIENT
Start: 2022-10-12 | End: 2022-10-12

## 2022-10-12 RX ORDER — AMOXICILLIN AND CLAVULANATE POTASSIUM 875; 125 MG/1; MG/1
1 TABLET, FILM COATED ORAL 2 TIMES DAILY
Qty: 14 TABLET | Refills: 0 | Status: SHIPPED | OUTPATIENT
Start: 2022-10-12

## 2022-10-12 RX ORDER — DIPHENHYDRAMINE HYDROCHLORIDE 50 MG/ML
25 INJECTION INTRAMUSCULAR; INTRAVENOUS ONCE
Status: COMPLETED | OUTPATIENT
Start: 2022-10-12 | End: 2022-10-12

## 2022-10-12 RX ORDER — SODIUM CHLORIDE 0.9 % (FLUSH) 0.9 %
10 SYRINGE (ML) INJECTION AS NEEDED
Status: DISCONTINUED | OUTPATIENT
Start: 2022-10-12 | End: 2022-10-12 | Stop reason: HOSPADM

## 2022-10-12 RX ADMIN — IOPAMIDOL 100 ML: 755 INJECTION, SOLUTION INTRAVENOUS at 18:32

## 2022-10-12 RX ADMIN — DIPHENHYDRAMINE HYDROCHLORIDE 25 MG: 50 INJECTION INTRAMUSCULAR; INTRAVENOUS at 17:03

## 2022-10-12 RX ADMIN — METOCLOPRAMIDE HYDROCHLORIDE 10 MG: 5 INJECTION INTRAMUSCULAR; INTRAVENOUS at 17:05

## 2022-10-12 RX ADMIN — KETOROLAC TROMETHAMINE 30 MG: 30 INJECTION, SOLUTION INTRAMUSCULAR; INTRAVENOUS at 17:06

## 2022-10-12 NOTE — ED PROVIDER NOTES
Time: 4:18 PM EDT  Chief Complaint:   Chief Complaint   Patient presents with   • Migraine   • Blurred Vision   • Nausea   • Abdominal Pain           History of Present Illness:  Patient is a 62 y.o. year old female who presents to the emergency department with migraine x 1 week. No ho of migraines, has not taken anything but tylenol and that was a couple days ago. Last bowel movement 2 days ago.              History provided by:  Patient  Headache  Pain location:  Generalized  Quality:  Dull  Radiates to:  Does not radiate  Onset quality:  Sudden  Duration:  1 week  Timing:  Constant  Progression:  Unchanged  Chronicity:  New  Similar to prior headaches: no    Context: loud noise    Relieved by:  Nothing  Worsened by:  Nothing  Ineffective treatments:  NSAIDs, acetaminophen and resting in a darkened room  Associated symptoms: abdominal pain and nausea    Associated symptoms: no back pain, no blurred vision, no congestion, no cough, no diarrhea, no dizziness, no drainage, no ear pain, no eye pain, no facial pain, no fatigue, no fever, no focal weakness, no hearing loss, no loss of balance, no myalgias, no near-syncope, no neck pain, no neck stiffness, no numbness, no paresthesias, no photophobia, no seizures, no sinus pressure, no sore throat, no swollen glands, no syncope, no tingling, no URI, no visual change, no vomiting and no weakness            Patient Care Team  Primary Care Provider: Johnna Mcnair APRN    Past Medical History:     Allergies   Allergen Reactions   • Baclofen Dizziness   • Methocarbamol Dizziness     Past Medical History:   Diagnosis Date   • Arthritis    • Asthma    • Bunion     RIGHT FOOT   • Chest pain     HX OF CHEST TIGHTNESS IN 2019 AND WAS ADMITTED SAW BY DR MACE WAS RELEASED. DENIES CP/SOA. FOLLOWED BY PCP. WORKS FULL TIME   • Corns and callus    • Foot pain, right 10/27/2020   • GERD (gastroesophageal reflux disease)    • Heel pain    • HTN (hypertension)    • Hyperlipidemia    •  Ingrown toenail     HX OF NO CURRENT ISSUES   • Numbness in feet     OCC   • Stroke (HCC)     2016 NO RESIDUAL FELT D/T HTN   • TIA (transient ischemic attack)     2019 IN [place] REPORTS NONE SINCE   • Type 2 diabetes mellitus (HCC)     DOES NOT CHECK BS DAILY     Past Surgical History:   Procedure Laterality Date   • ABDOMINAL SURGERY     • BUNIONECTOMY Right 2022    Procedure: BUNIONECTOMY REVERDINE GREEN LAYERED WITH AKIN;  Surgeon: Colby Lam DPM;  Location: [facility] MAIN OR;  Service: Podiatry;  Laterality: Right;   •  SECTION     • COLONOSCOPY     • ENDOSCOPY     • OTHER SURGICAL HISTORY      REPORTED AFTER  SPONGE HAD BEEN LEFT HAD TO HAVE SURGERY TO REMOVE   • POSTPARTUM TUBAL LIGATION     • TONSILLECTOMY       Family History   Problem Relation Age of Onset   • Heart disease Mother    • Diabetes Mother    • Stroke Brother        Home Medications:  Prior to Admission medications    Medication Sig Start Date End Date Taking? Authorizing Provider   amLODIPine (NORVASC) 10 MG tablet Take 10 mg by mouth Daily. 4/15/22   Brad Goldstein MD   aspirin 81 MG EC tablet Take 81 mg by mouth Daily. LAST DOSE OF ASA 22 PER Brad Adams MD   atorvastatin (LIPITOR) 20 MG tablet Take 20 mg by mouth Every Night. 4/15/22   Brad Goldstein MD   metFORMIN (GLUCOPHAGE) 500 MG tablet Take 500 mg by mouth 2 (Two) Times a Day With Meals. INSTRUCTED PER ANESTHESIA PROTOCOL 4/15/22   Brad Goldstein MD   metoprolol tartrate (LOPRESSOR) 25 MG tablet Take 25 mg by mouth Daily. 21   Brad Goldstein MD   Multiple Vitamins-Minerals (MULTIVITAMIN ADULT EXTRA C PO) multivitamin oral tablet take 1 tablet by oral route daily   Active    ProviderBrad MD   pantoprazole (PROTONIX) 20 MG EC tablet Take 20 mg by mouth Daily. 22   Brad Goldstein MD   promethazine (PHENERGAN) 12.5 MG tablet Take 1 tablet by mouth Every 8 (Eight) Hours As  "Needed for Nausea or Vomiting. 5/6/22   Colby Lam DPM        Social History:   Social History     Tobacco Use   • Smoking status: Former     Packs/day: 3.00     Types: Cigarettes     Quit date: 2019     Years since quitting: 3.7   • Smokeless tobacco: Never   Vaping Use   • Vaping Use: Never used   Substance Use Topics   • Alcohol use: Yes     Comment: OCC   • Drug use: Not Currently     Types: Cocaine(coke)     Comment: LAST USED AROUND 2018         Review of Systems:  Review of Systems   Constitutional: Negative for chills, fatigue and fever.   HENT: Negative for congestion, ear pain, hearing loss, postnasal drip, sinus pressure and sore throat.    Eyes: Negative for blurred vision, photophobia and pain.   Respiratory: Negative for cough, chest tightness and shortness of breath.    Cardiovascular: Negative for chest pain, syncope and near-syncope.   Gastrointestinal: Positive for abdominal pain, constipation and nausea. Negative for diarrhea and vomiting.   Genitourinary: Negative for flank pain and hematuria.   Musculoskeletal: Negative for back pain, joint swelling, myalgias, neck pain and neck stiffness.   Skin: Negative for pallor.   Neurological: Positive for headaches. Negative for dizziness, focal weakness, seizures, weakness, numbness, paresthesias and loss of balance.   All other systems reviewed and are negative.       Physical Exam:  /66 (BP Location: Left arm, Patient Position: Sitting)   Pulse 74   Temp 98.3 °F (36.8 °C) (Oral)   Resp 19   Ht 165.1 cm (65\")   Wt 84.7 kg (186 lb 11.7 oz)   SpO2 99%   BMI 31.07 kg/m²     Physical Exam  Vitals and nursing note reviewed.   Constitutional:       General: She is not in acute distress.     Appearance: Normal appearance. She is not toxic-appearing.   HENT:      Head: Normocephalic and atraumatic.      Mouth/Throat:      Mouth: Mucous membranes are moist.   Eyes:      General: No scleral icterus.     Pupils: Pupils are equal, round, " and reactive to light.   Cardiovascular:      Rate and Rhythm: Normal rate and regular rhythm.      Pulses: Normal pulses.      Heart sounds: Normal heart sounds.   Pulmonary:      Effort: Pulmonary effort is normal. No respiratory distress.      Breath sounds: Normal breath sounds.   Abdominal:      General: Abdomen is flat. There is no distension.      Palpations: Abdomen is soft.      Tenderness: There is abdominal tenderness in the left lower quadrant.   Musculoskeletal:         General: Normal range of motion.      Cervical back: Normal range of motion and neck supple.   Skin:     General: Skin is warm and dry.   Neurological:      General: No focal deficit present.      Mental Status: She is alert and oriented to person, place, and time. Mental status is at baseline.   Psychiatric:         Mood and Affect: Mood normal.         Behavior: Behavior normal.                Medications in the Emergency Department:  Medications   sodium chloride 0.9 % flush 10 mL (has no administration in time range)   metoclopramide (REGLAN) injection 10 mg (10 mg Intravenous Given 10/12/22 1705)   ketorolac (TORADOL) injection 30 mg (30 mg Intravenous Given 10/12/22 1706)   diphenhydrAMINE (BENADRYL) injection 25 mg (25 mg Intravenous Given 10/12/22 1703)   iopamidol (ISOVUE-370) 76 % injection 100 mL (100 mL Intravenous Given 10/12/22 1832)        Labs  Lab Results (last 24 hours)     Procedure Component Value Units Date/Time    POC Glucose Once [[id number]]  (Normal) Collected: 10/12/22 1428    Specimen: Blood Updated: 10/12/22 1429     Glucose 76 mg/dL      Comment: Serial Number: [id number]:  [id number]       CBC & Differential [[id number]]  (Normal) Collected: 10/12/22 1434    Specimen: Blood Updated: 10/12/22 1442    Narrative:      The following orders were created for panel order CBC & Differential.  Procedure                               Abnormality         Status                     ---------                                -----------         ------                     CBC Auto Differential[[id number]]        Normal              Final result                 Please view results for these tests on the individual orders.    Comprehensive Metabolic Panel [[id number]]  (Abnormal) Collected: 10/12/22 1434    Specimen: Blood Updated: 10/12/22 1500     Glucose 83 mg/dL      BUN 11 mg/dL      Creatinine 0.87 mg/dL      Sodium 141 mmol/L      Potassium 4.4 mmol/L      Chloride 104 mmol/L      CO2 29.2 mmol/L      Calcium 9.3 mg/dL      Total Protein 7.6 g/dL      Albumin 4.30 g/dL      ALT (SGPT) 16 U/L      AST (SGOT) 16 U/L      Alkaline Phosphatase 145 U/L      Total Bilirubin 0.3 mg/dL      Globulin 3.3 gm/dL      A/G Ratio 1.3 g/dL      BUN/Creatinine Ratio 12.6     Anion Gap 7.8 mmol/L      eGFR 75.4 mL/min/1.73      Comment: National Kidney Foundation and American Society of Nephrology (ASN) Task Force recommended calculation based on the Chronic Kidney Disease Epidemiology Collaboration (CKD-EPI) equation refit without adjustment for race.       Narrative:      GFR Normal >60  Chronic Kidney Disease <60  Kidney Failure <15      Lipase [[id number]]  (Abnormal) Collected: 10/12/22 1434    Specimen: Blood Updated: 10/12/22 1500     Lipase 64 U/L     Lactic Acid, Plasma [[id number]]  (Normal) Collected: 10/12/22 1434    Specimen: Blood Updated: 10/12/22 1456     Lactate 0.6 mmol/L     CBC Auto Differential [[id number]]  (Normal) Collected: 10/12/22 1434    Specimen: Blood Updated: 10/12/22 1442     WBC 8.58 10*3/mm3      RBC 4.88 10*6/mm3      Hemoglobin 14.4 g/dL      Hematocrit 42.7 %      MCV 87.5 fL      MCH 29.5 pg      MCHC 33.7 g/dL      RDW 12.6 %      RDW-SD 40.0 fl      MPV 9.1 fL      Platelets 334 10*3/mm3      Neutrophil % 55.8 %      Lymphocyte % 35.7 %      Monocyte % 5.2 %      Eosinophil % 2.9 %      Basophil % 0.2 %      Immature Grans % 0.2 %      Neutrophils, Absolute 4.78 10*3/mm3      Lymphocytes, Absolute 3.06  10*3/mm3      Monocytes, Absolute 0.45 10*3/mm3      Eosinophils, Absolute 0.25 10*3/mm3      Basophils, Absolute 0.02 10*3/mm3      Immature Grans, Absolute 0.02 10*3/mm3      nRBC 0.0 /100 WBC     Urinalysis With Microscopic If Indicated (No Culture) - Urine, Clean Catch [[id number]]  (Abnormal) Collected: 10/12/22 1709    Specimen: Urine, Clean Catch Updated: 10/12/22 1722     Color, UA Yellow     Appearance, UA Clear     pH, UA 5.5     Specific Gravity, UA 1.020     Glucose, UA Negative     Ketones, UA Negative     Bilirubin, UA Negative     Blood, UA Small (1+)     Protein, UA Negative     Leuk Esterase, UA Moderate (2+)     Nitrite, UA Negative     Urobilinogen, UA 1.0 E.U./dL    Urinalysis, Microscopic Only - Urine, Clean Catch [[id number]]  (Abnormal) Collected: 10/12/22 1709    Specimen: Urine, Clean Catch Updated: 10/12/22 1722     RBC, UA 3-5 /HPF      WBC, UA 6-12 /HPF      Bacteria, UA 1+ /HPF      Squamous Epithelial Cells, UA 7-12 /HPF      Hyaline Casts, UA 0-2 /LPF      Methodology Automated Microscopy           Imaging:  CT Head Without Contrast    Result Date: 10/12/2022  PROCEDURE: CT HEAD WO CONTRAST  COMPARISON:  None INDICATIONS: Headache, new or worsening (Age >= 50y), nausea, blurred vision  PROTOCOL:   Standard imaging protocol performed    RADIATION:   DLP: 1017.2mGy*cm   MA and/or KV was adjusted to minimize radiation dose.     TECHNIQUE: After obtaining the patient's consent, CT images were obtained without non-ionic intravenous contrast material.  FINDINGS:  There is no calvarial fracture.  Mastoid air cells are clear Minimal sphenoid sinus disease with a small air-fluid level on the right.  No extra-axial hemorrhage.  No intraparenchymal hemorrhage.  Gray-white differentiation appears maintained.  No mass effect or midline shift.  Mild vascular calcifications.  There is moderate patchy areas of hypodensity in the white matter including the subcortical white matter, centrum semiovale, and  periventricular white matter.        1. No acute intracranial hemorrhage, mass effect, or midline shift. 2. Moderate multiple patchy areas of hypodensity in the white matter including subcortical white matter, centrum semiovale, and periventricular white matter.  Findings could reflect small vessel ischemic change.  Other white matter processes including demyelination or migraine possible.  Correlate with history.  MRI could be considered. 3. Minimal sphenoid sinus disease.     GEMMA DRUMMOND MD       Electronically Signed and Approved By: GEMMA DRUMMOND MD on 10/12/2022 at 17:10             CT Abdomen Pelvis With Contrast    Result Date: 10/12/2022  PROCEDURE: CT ABDOMEN PELVIS W CONTRAST  COMPARISON: None  INDICATIONS: llq abd pain  TECHNIQUE: After obtaining the patient's consent, CT images were created with non-ionic intravenous contrast material.   PROTOCOL:   Standard imaging protocol performed    RADIATION:      Automated exposure control was utilized to minimize radiation dose.  FINDINGS:  Emphysematous changes are noted in the lung bases.  Multiple stones are noted in the gallbladder without CT evidence of cholecystitis.  There are 2 sub cm hypodense foci in the left hepatic lobe, too small for definitive characterization.  The spleen, pancreas and adrenal glands appear unremarkable.  Both kidneys appear normal.  There is a fat containing right inguinal hernia which measures 2.7 cm transverse.  No adenopathy or free fluid is seen in the abdomen or pelvis.  There are infiltrative changes near the proximal sigmoid colon.  There is apparent mild wall thickening of the colon at this level.  No free air or abscess is identified.  Colonic diverticulosis is noted.  A moderate amount of stool is noted in the ascending and transverse colon.  The appendix appears normal.  The uterus and adnexal regions appear unremarkable.  There is a 0.5 cm anterolisthesis of L4 on L5.  Osseous structures otherwise appear  unremarkable.        1. Pericolonic infiltrative changes along the proximal sigmoid colon suspected to represent acute diverticulitis. 2. Apparent wall thickening of the proximal sigmoid colon at this level may be secondary to superimposed colitis.  Ischemia and neoplasm are considered less likely but not definitively excluded.  Consider a follow-up colonoscopy to further evaluate. 3. Cholelithiasis 4. Subcentimeter hypodense foci in the left hepatic lobe, too small for definitive characterization but statistically most likely small cysts or hemangiomas 5. Right fat containing inguinal hernia measuring 2.7 cm transverse     Osvaldo Mujica M.D.       Electronically Signed and Approved By: Osvaldo Mujica M.D. on 10/12/2022 at 19:47               Procedures:  Procedures    Progress  ED Course as of 10/12/22 2145   Wed Oct 12, 2022   1621 --- PROVIDER IN TRIAGE NOTE ---    The patient was evaluated my meHetal in triage. Orders were placed and the patient is currently awaiting disposition.    [AJ]   2039 Pt reports headache is completely gone. Discussed CT head results and need to follow up with PCP for probable MRI to rule out differential diagnoses. Also discussed CT abdomen results and need to follow up for probable colonoscopy.  [CM]      ED Course User Index  [AJ] Hetal Toledo PA-C  [CM] Akash Torres APRN                            The patient was initially evaluated in the triage area where orders were placed. The patient was later dispositioned by SOILA Marr.      The patient was advised to stay for completion of workup which includes but is not limited to communication of labs and radiological results, reassessment and plan. The patient was advised that leaving prior to disposition by a provider could result in critical findings that are not communicated to the patient.     Medical Decision Making:  MDM  Number of Diagnoses or Management Options  Abnormal head CT: new and  requires workup  Diverticulitis: new and requires workup  Headache, unspecified headache type: new and requires workup  Diagnosis management comments: The patient was found to have a incidental finding of changes to white matter on CT head. The patient was notified of these findings and advised to follow up for a repeat examination and imaging. Patient understands these findings and will follow up as described in the discharge instructions.    The patient is resting comfortably and feels better, is alert and in no distress. Repeat examination is unremarkable and benign; in particular, there's no discomfort at McBurney's point and there is no pulsatile mass. The history, exam, diagnostic testing, and current condition does not suggest acute appendicitis, bowel obstruction, acute cholecystitis, bowel perforation, major gastrointestinal bleeding, severe diverticulitis, abdominal aortic aneurysm, mesenteric ischemia, volvulus, sepsis, or other significant pathology that warrants further testing, continued ED treatment, admission, for surgical evaluation at this point. The vital signs have been stable. The patient does not have uncontrollable pain, intractable vomiting, or other significant symptoms. The patient's condition is stable and appropriate for discharge from the emergency department.    The patient presented to the emergency department with a headache. The patient is now resting comfortably in feels better, is alert, talkative, interactive and in no distress after ED treatment. The patient appears well and is able to tolerate PO fluids. Repeat examination is unremarkable and benign. The patient is neurologically intact, has a normal mental status, and this ambulatory in the ED. The history, exam, diagnostic testing (if any) and the patient's current condition do not suggest meningitis, stroke, sepsis, subarachnoid hemorrhage, intracranial bleeding, encephalitis, temporal arteritis or other significant pathology  to warrant further testing, continued ED treatment, admission, neurological consultation, for other specialist evaluation at this point. The vital signs have been stable. The patient's condition is stable and appropriate for discharge. The patient will pursue further outpatient evaluation with the primary care physician or other designated or consulting physician as indicated in the discharge instructions.       Amount and/or Complexity of Data Reviewed  Clinical lab tests: reviewed  Tests in the radiology section of CPT®: reviewed    Risk of Complications, Morbidity, and/or Mortality  Presenting problems: moderate  Diagnostic procedures: moderate  Management options: low    Patient Progress  Patient progress: improved           The following orders were placed after triage and evaluation:  Orders Placed This Encounter   Procedures   • CT Head Without Contrast   • CT Abdomen Pelvis With Contrast   • Osteen Draw   • Comprehensive Metabolic Panel   • Lipase   • Urinalysis With Microscopic If Indicated (No Culture) - Urine, Clean Catch   • Lactic Acid, Plasma   • CBC Auto Differential   • Urinalysis, Microscopic Only - Urine, Clean Catch   • NPO Diet NPO Type: Strict NPO   • Undress & Gown   • POC Glucose Once   • Insert Peripheral IV   • CBC & Differential   • Green Top (Gel)   • Lavender Top   • Gold Top - SST   • Light Blue Top       Final diagnoses:   Diverticulitis   Headache, unspecified headache type   Abnormal head CT          Disposition:  ED Disposition     ED Disposition   Discharge    Condition   Stable    Comment   --             This medical record created using voice recognition software.           Akash Torres, APRN  10/12/22 9108

## 2022-10-13 NOTE — DISCHARGE INSTRUCTIONS
Follow up with your PCP for probable further testing related to you CT of your head today. Return to the ER for return of severe headache, fever, worsening abdominal pain or any concerns.

## 2023-05-26 ENCOUNTER — OFFICE (OUTPATIENT)
Dept: URBAN - METROPOLITAN AREA CLINIC 76 | Facility: CLINIC | Age: 63
End: 2023-05-26

## 2023-05-26 VITALS
OXYGEN SATURATION: 96 % | WEIGHT: 190 LBS | HEART RATE: 77 BPM | DIASTOLIC BLOOD PRESSURE: 82 MMHG | SYSTOLIC BLOOD PRESSURE: 132 MMHG | HEIGHT: 65 IN

## 2023-05-26 DIAGNOSIS — K57.32 DIVERTICULITIS OF LARGE INTESTINE WITHOUT PERFORATION OR ABS: ICD-10-CM

## 2023-05-26 DIAGNOSIS — K57.30 DIVERTICULOSIS OF LARGE INTESTINE WITHOUT PERFORATION OR ABS: ICD-10-CM

## 2023-05-26 DIAGNOSIS — K21.9 GASTRO-ESOPHAGEAL REFLUX DISEASE WITHOUT ESOPHAGITIS: ICD-10-CM

## 2023-05-26 DIAGNOSIS — K80.20 CALCULUS OF GALLBLADDER WITHOUT CHOLECYSTITIS WITHOUT OBSTRU: ICD-10-CM

## 2023-05-26 DIAGNOSIS — R74.8 ABNORMAL LEVELS OF OTHER SERUM ENZYMES: ICD-10-CM

## 2023-05-26 PROCEDURE — 99204 OFFICE O/P NEW MOD 45 MIN: CPT | Performed by: INTERNAL MEDICINE

## 2023-05-26 RX ORDER — PANTOPRAZOLE SODIUM 40 MG/1
40 TABLET, DELAYED RELEASE ORAL
Qty: 30 | Refills: 6 | Status: ACTIVE
Start: 2023-05-26

## 2023-07-14 ENCOUNTER — ON CAMPUS - OUTPATIENT (OUTPATIENT)
Dept: URBAN - METROPOLITAN AREA HOSPITAL 108 | Facility: HOSPITAL | Age: 63
End: 2023-07-14

## 2023-07-14 DIAGNOSIS — R10.84 GENERALIZED ABDOMINAL PAIN: ICD-10-CM

## 2023-07-14 DIAGNOSIS — K29.70 GASTRITIS, UNSPECIFIED, WITHOUT BLEEDING: ICD-10-CM

## 2023-07-14 PROCEDURE — 43239 EGD BIOPSY SINGLE/MULTIPLE: CPT | Performed by: INTERNAL MEDICINE

## 2023-08-08 ENCOUNTER — ON CAMPUS - OUTPATIENT (OUTPATIENT)
Dept: URBAN - METROPOLITAN AREA HOSPITAL 108 | Facility: HOSPITAL | Age: 63
End: 2023-08-08
Payer: COMMERCIAL

## 2023-08-08 DIAGNOSIS — Z12.11 ENCOUNTER FOR SCREENING FOR MALIGNANT NEOPLASM OF COLON: ICD-10-CM

## 2023-08-08 DIAGNOSIS — K57.30 DIVERTICULOSIS OF LARGE INTESTINE WITHOUT PERFORATION OR ABS: ICD-10-CM

## 2023-08-08 PROCEDURE — 45380 COLONOSCOPY AND BIOPSY: CPT | Performed by: INTERNAL MEDICINE

## 2025-02-12 NOTE — PRE-PROCEDURE INSTRUCTIONS
Detail Level: Zone IMPORTANT INSTRUCTIONS - PRE-ADMISSION TESTING  1. DO NOT EAT OR CHEW anything after midnight the night before your procedure.    2. You may have CLEAR liquids up to ____2__ hours prior to ARRIVAL time.   3. Take the following medications the morning of your procedure with JUST A SIP OF WATER:  ____METOPROLOL, AMDLODIPINE, PROTONIX___________________________________________________________________________________________________________________________________________________________________________________    4. DO NOT BRING your medications to the hospital with you, UNLESS something has changed since your PRE-Admission Testing appointment.  5. STARTING TODAY Hold all vitamins, supplements, and NSAIDS (Non- steroidal anti-inflammatory meds) for one week prior to surgery (you MAY take Tylenol or Acetaminophen).  6. If you are diabetic, check your blood sugar the morning of your procedure. If it is less than 70 or if you are feeling symptomatic, call the following number for further instructions: [phone]______.  7. Use your inhalers/nebulizers as usual, the morning of your procedure. BRING YOUR INHALERS with you.   8. Bring your CPAP or BIPAP to hospital, ONLY IF YOU WILL BE SPENDING THE NIGHT.   9. Make sure you have a ride home and have someone who will stay with you the day of your procedure after you go home.  10. If you have any questions, please call your Pre-Admission Testing Nurse, __KASSANDRA______________ at [phone]____________.   11. Per anesthesia request, do not smoke for 24 hours before your procedure or as instructed by your surgeon.   12.  BATHING INSTRUCTIONS GIVEN. NO JEWELRY DAY OF PROCEDURE. NO NAIL POLISH UPPER OR LOWER EXTREMITIES.  13. ELEVATOR A 3RD FLOOR  14. PER DR MITCHELL STOP ASA TODAY 5/2/22  15. ON 5/5/22 NO METFORMIN AFTER 6 PM   Detail Level: Detailed

## (undated) DEVICE — GOWN,NON-REINFORCED,SIRUS,SET IN SLV,XXL: Brand: MEDLINE

## (undated) DEVICE — GAUZE,SPONGE,4"X4",16PLY,STRL,LF,10/TRAY: Brand: MEDLINE

## (undated) DEVICE — UNDERCAST PADDING: Brand: DEROYAL

## (undated) DEVICE — BANDAGE,GAUZE,BULKEE II,4.5"X4.1YD,STRL: Brand: MEDLINE

## (undated) DEVICE — APPL CHLORAPREP HI/LITE 26ML ORNG

## (undated) DEVICE — STANDARD HYPODERMIC NEEDLE,POLYPROPYLENE HUB: Brand: MONOJECT

## (undated) DEVICE — SOL IRR NACL 0.9PCT BT 1000ML

## (undated) DEVICE — SUT PROLN 4/0 P3 18IN 8699G

## (undated) DEVICE — GLV SURG SENSICARE SLT PF LF 9 STRL

## (undated) DEVICE — DISPOSABLE TOURNIQUET CUFF SINGLE BLADDER, SINGLE PORT AND QUICK CONNECT CONNECTOR: Brand: COLOR CUFF

## (undated) DEVICE — 1010 S-DRAPE TOWEL DRAPE 10/BX: Brand: STERI-DRAPE™

## (undated) DEVICE — GLV SURG ULTRAFREE MAX PF LTX SZ9

## (undated) DEVICE — BNDG ELAS CO-FLEX SLF ADHR 4IN5YD LF STRL

## (undated) DEVICE — BLAD SAW SAG MIC FINE 9.5X25.5X0.04MM PK/5

## (undated) DEVICE — BLD SCLPL SURG PREM SS SZ15C

## (undated) DEVICE — DRSNG SURG AQUACEL AG 9X10CM

## (undated) DEVICE — SYR LL TP 10ML STRL

## (undated) DEVICE — K-WIRE, SINGLE ENDED TROCAR TIP, SMOOTH, 0.9 X 150MM
Type: IMPLANTABLE DEVICE | Site: FOOT | Status: NON-FUNCTIONAL
Brand: MONSTER SCREW SYSTEM
Removed: 2022-05-06

## (undated) DEVICE — BNDG ESMARK 4IN 12FT LF STRL BLU

## (undated) DEVICE — SUT VIC 3/0 X1 27IN J458H

## (undated) DEVICE — COUNTERSINK, 2.5 HEADLESS: Brand: MONSTER® SCREW SYSTEM

## (undated) DEVICE — PENCL E/S SMOKEEVAC W/TELESCP CANN

## (undated) DEVICE — EXTREMITY-LF: Brand: MEDLINE INDUSTRIES, INC.

## (undated) DEVICE — DRSNG WND GZ CURAD OIL EMULSION 3X3IN STRL

## (undated) DEVICE — BNDG GZ SOF-FORM CONFRM 3X75IN LF STRL

## (undated) DEVICE — 3M™ STERI-DRAPE™ X-RAY IMAGE INTENSIFIER DRAPE, 10 PER CARTON / 4 CARTONS PER CASE, 1013: Brand: STERI-DRAPE™

## (undated) DEVICE — BNDG COTN/ELAS FLEXMASTER DBL/LENGTH CLIP/CLS 6IN 11YD

## (undated) DEVICE — SUT VIC 2/0 SH 27IN